# Patient Record
Sex: FEMALE | Race: NATIVE HAWAIIAN OR OTHER PACIFIC ISLANDER | HISPANIC OR LATINO | Employment: FULL TIME | URBAN - METROPOLITAN AREA
[De-identification: names, ages, dates, MRNs, and addresses within clinical notes are randomized per-mention and may not be internally consistent; named-entity substitution may affect disease eponyms.]

---

## 2017-01-05 ENCOUNTER — ALLSCRIPTS OFFICE VISIT (OUTPATIENT)
Dept: OTHER | Facility: OTHER | Age: 37
End: 2017-01-05

## 2017-01-05 LAB — HCG, QUALITATIVE (HISTORICAL): NEGATIVE

## 2017-01-09 ENCOUNTER — HOSPITAL ENCOUNTER (OUTPATIENT)
Dept: RADIOLOGY | Facility: HOSPITAL | Age: 37
Discharge: HOME | End: 2017-01-09
Payer: COMMERCIAL

## 2017-01-09 DIAGNOSIS — N93.9 ABNORMAL UTERINE AND VAGINAL BLEEDING, UNSPECIFIED: ICD-10-CM

## 2017-01-09 PROCEDURE — 76856 US EXAM PELVIC COMPLETE: CPT

## 2017-01-09 PROCEDURE — 76830 TRANSVAGINAL US NON-OB: CPT

## 2017-01-20 ENCOUNTER — APPOINTMENT (OUTPATIENT)
Dept: LAB | Facility: HOSPITAL | Age: 37
End: 2017-01-20
Payer: COMMERCIAL

## 2017-01-20 ENCOUNTER — TRANSCRIBE ORDERS (OUTPATIENT)
Dept: LAB | Facility: HOSPITAL | Age: 37
End: 2017-01-20

## 2017-01-20 DIAGNOSIS — E66.9 OBESITY: ICD-10-CM

## 2017-01-20 DIAGNOSIS — E78.5 HYPERLIPIDEMIA: ICD-10-CM

## 2017-01-20 DIAGNOSIS — E11.65 TYPE 2 DIABETES MELLITUS WITH HYPERGLYCEMIA (HCC): ICD-10-CM

## 2017-01-20 DIAGNOSIS — N93.9 ABNORMAL UTERINE AND VAGINAL BLEEDING, UNSPECIFIED: ICD-10-CM

## 2017-01-20 DIAGNOSIS — N97.9 FEMALE INFERTILITY: ICD-10-CM

## 2017-01-20 DIAGNOSIS — E11.29 TYPE 2 DIABETES MELLITUS WITH OTHER DIABETIC KIDNEY COMPLICATION (HCC): ICD-10-CM

## 2017-01-20 LAB
25(OH)D3 SERPL-MCNC: 14.1 NG/ML (ref 30–100)
ALBUMIN SERPL BCP-MCNC: 3.5 G/DL (ref 3.5–5)
ALP SERPL-CCNC: 60 U/L (ref 46–116)
ALT SERPL W P-5'-P-CCNC: 79 U/L (ref 12–78)
ANION GAP SERPL CALCULATED.3IONS-SCNC: 6 MMOL/L (ref 4–13)
AST SERPL W P-5'-P-CCNC: 32 U/L (ref 5–45)
BASOPHILS # BLD AUTO: 0.03 THOUSANDS/ΜL (ref 0–0.1)
BASOPHILS NFR BLD AUTO: 0 % (ref 0–1)
BILIRUB SERPL-MCNC: 0.49 MG/DL (ref 0.2–1)
BUN SERPL-MCNC: 10 MG/DL (ref 5–25)
CALCIUM SERPL-MCNC: 8.6 MG/DL (ref 8.3–10.1)
CHLORIDE SERPL-SCNC: 104 MMOL/L (ref 100–108)
CHOLEST SERPL-MCNC: 199 MG/DL (ref 50–200)
CO2 SERPL-SCNC: 28 MMOL/L (ref 21–32)
CREAT SERPL-MCNC: 0.67 MG/DL (ref 0.6–1.3)
EOSINOPHIL # BLD AUTO: 0.1 THOUSAND/ΜL (ref 0–0.61)
EOSINOPHIL NFR BLD AUTO: 1 % (ref 0–6)
ERYTHROCYTE [DISTWIDTH] IN BLOOD BY AUTOMATED COUNT: 12.9 % (ref 11.6–15.1)
EST. AVERAGE GLUCOSE BLD GHB EST-MCNC: 143 MG/DL
GFR SERPL CREATININE-BSD FRML MDRD: >60 ML/MIN/1.73SQ M
GLUCOSE SERPL-MCNC: 133 MG/DL (ref 65–140)
HBA1C MFR BLD: 6.6 % (ref 4.2–6.3)
HCT VFR BLD AUTO: 37.6 % (ref 34.8–46.1)
HDLC SERPL-MCNC: 55 MG/DL (ref 40–60)
HGB BLD-MCNC: 13 G/DL (ref 11.5–15.4)
LDLC SERPL CALC-MCNC: 126 MG/DL (ref 0–100)
LYMPHOCYTES # BLD AUTO: 2.49 THOUSANDS/ΜL (ref 0.6–4.47)
LYMPHOCYTES NFR BLD AUTO: 33 % (ref 14–44)
MCH RBC QN AUTO: 29.7 PG (ref 26.8–34.3)
MCHC RBC AUTO-ENTMCNC: 34.6 G/DL (ref 31.4–37.4)
MCV RBC AUTO: 86 FL (ref 82–98)
MONOCYTES # BLD AUTO: 0.45 THOUSAND/ΜL (ref 0.17–1.22)
MONOCYTES NFR BLD AUTO: 6 % (ref 4–12)
NEUTROPHILS # BLD AUTO: 4.55 THOUSANDS/ΜL (ref 1.85–7.62)
NEUTS SEG NFR BLD AUTO: 60 % (ref 43–75)
NRBC BLD AUTO-RTO: 0 /100 WBCS
PLATELET # BLD AUTO: 217 THOUSANDS/UL (ref 149–390)
PMV BLD AUTO: 10.3 FL (ref 8.9–12.7)
POTASSIUM SERPL-SCNC: 3.7 MMOL/L (ref 3.5–5.3)
PROT SERPL-MCNC: 7.2 G/DL (ref 6.4–8.2)
RBC # BLD AUTO: 4.38 MILLION/UL (ref 3.81–5.12)
SODIUM SERPL-SCNC: 138 MMOL/L (ref 136–145)
TRIGL SERPL-MCNC: 92 MG/DL
WBC # BLD AUTO: 7.63 THOUSAND/UL (ref 4.31–10.16)

## 2017-01-20 PROCEDURE — 80061 LIPID PANEL: CPT

## 2017-01-20 PROCEDURE — 85025 COMPLETE CBC W/AUTO DIFF WBC: CPT

## 2017-01-20 PROCEDURE — 82306 VITAMIN D 25 HYDROXY: CPT

## 2017-01-20 PROCEDURE — 83036 HEMOGLOBIN GLYCOSYLATED A1C: CPT

## 2017-01-20 PROCEDURE — 80053 COMPREHEN METABOLIC PANEL: CPT

## 2017-01-20 PROCEDURE — 36415 COLL VENOUS BLD VENIPUNCTURE: CPT

## 2017-02-06 ENCOUNTER — GENERIC CONVERSION - ENCOUNTER (OUTPATIENT)
Dept: OTHER | Facility: OTHER | Age: 37
End: 2017-02-06

## 2017-02-21 ENCOUNTER — ALLSCRIPTS OFFICE VISIT (OUTPATIENT)
Dept: OTHER | Facility: OTHER | Age: 37
End: 2017-02-21

## 2017-02-21 ENCOUNTER — LAB REQUISITION (OUTPATIENT)
Dept: LAB | Facility: HOSPITAL | Age: 37
End: 2017-02-21
Payer: COMMERCIAL

## 2017-02-21 DIAGNOSIS — Z11.3 ENCOUNTER FOR SCREENING FOR INFECTIONS WITH PREDOMINANTLY SEXUAL MODE OF TRANSMISSION: ICD-10-CM

## 2017-02-21 DIAGNOSIS — N93.9 ABNORMAL UTERINE AND VAGINAL BLEEDING, UNSPECIFIED: ICD-10-CM

## 2017-02-21 PROCEDURE — 87491 CHLMYD TRACH DNA AMP PROBE: CPT | Performed by: OBSTETRICS & GYNECOLOGY

## 2017-02-21 PROCEDURE — 87591 N.GONORRHOEAE DNA AMP PROB: CPT | Performed by: OBSTETRICS & GYNECOLOGY

## 2017-02-24 LAB
CHLAMYDIA DNA CVX QL NAA+PROBE: NORMAL
N GONORRHOEA DNA GENITAL QL NAA+PROBE: NORMAL

## 2017-03-10 ENCOUNTER — HOSPITAL ENCOUNTER (OUTPATIENT)
Dept: RADIOLOGY | Facility: HOSPITAL | Age: 37
Discharge: HOME | End: 2017-03-10
Payer: COMMERCIAL

## 2017-03-10 DIAGNOSIS — N93.9 ABNORMAL UTERINE AND VAGINAL BLEEDING, UNSPECIFIED: ICD-10-CM

## 2017-03-10 PROCEDURE — 76856 US EXAM PELVIC COMPLETE: CPT

## 2017-03-10 PROCEDURE — 76830 TRANSVAGINAL US NON-OB: CPT

## 2017-03-17 ENCOUNTER — LAB REQUISITION (OUTPATIENT)
Dept: LAB | Facility: HOSPITAL | Age: 37
End: 2017-03-17
Payer: COMMERCIAL

## 2017-03-17 ENCOUNTER — ALLSCRIPTS OFFICE VISIT (OUTPATIENT)
Dept: OTHER | Facility: OTHER | Age: 37
End: 2017-03-17

## 2017-03-17 DIAGNOSIS — Z11.3 ENCOUNTER FOR SCREENING FOR INFECTIONS WITH PREDOMINANTLY SEXUAL MODE OF TRANSMISSION: ICD-10-CM

## 2017-03-17 LAB
CHLAMYDIA DNA CVX QL NAA+PROBE: NORMAL
N GONORRHOEA DNA GENITAL QL NAA+PROBE: NORMAL

## 2017-03-17 PROCEDURE — 87591 N.GONORRHOEAE DNA AMP PROB: CPT | Performed by: OBSTETRICS & GYNECOLOGY

## 2017-03-17 PROCEDURE — 87491 CHLMYD TRACH DNA AMP PROBE: CPT | Performed by: OBSTETRICS & GYNECOLOGY

## 2017-03-27 ENCOUNTER — ALLSCRIPTS OFFICE VISIT (OUTPATIENT)
Dept: OTHER | Facility: OTHER | Age: 37
End: 2017-03-27

## 2017-04-25 ENCOUNTER — ALLSCRIPTS OFFICE VISIT (OUTPATIENT)
Dept: OTHER | Facility: OTHER | Age: 37
End: 2017-04-25

## 2017-05-04 ENCOUNTER — LAB REQUISITION (OUTPATIENT)
Dept: LAB | Facility: HOSPITAL | Age: 37
End: 2017-05-04
Payer: COMMERCIAL

## 2017-05-04 ENCOUNTER — ALLSCRIPTS OFFICE VISIT (OUTPATIENT)
Dept: OTHER | Facility: OTHER | Age: 37
End: 2017-05-04

## 2017-05-04 DIAGNOSIS — N97.9 FEMALE INFERTILITY: ICD-10-CM

## 2017-05-04 PROCEDURE — 87070 CULTURE OTHR SPECIMN AEROBIC: CPT | Performed by: OBSTETRICS & GYNECOLOGY

## 2017-05-06 LAB
BACTERIA GENITAL AEROBE CULT: NORMAL
BACTERIA GENITAL AEROBE CULT: NORMAL

## 2017-05-24 ENCOUNTER — LAB REQUISITION (OUTPATIENT)
Dept: LAB | Facility: HOSPITAL | Age: 37
End: 2017-05-24
Payer: COMMERCIAL

## 2017-05-24 ENCOUNTER — ALLSCRIPTS OFFICE VISIT (OUTPATIENT)
Dept: OTHER | Facility: OTHER | Age: 37
End: 2017-05-24

## 2017-05-24 DIAGNOSIS — R10.9 ABDOMINAL PAIN: ICD-10-CM

## 2017-05-24 DIAGNOSIS — E11.29 TYPE 2 DIABETES MELLITUS WITH OTHER DIABETIC KIDNEY COMPLICATION (HCC): ICD-10-CM

## 2017-05-24 DIAGNOSIS — E11.65 TYPE 2 DIABETES MELLITUS WITH HYPERGLYCEMIA (HCC): ICD-10-CM

## 2017-05-24 LAB
BACTERIA UR QL AUTO: ABNORMAL /HPF
BILIRUB UR QL STRIP: NEGATIVE
BILIRUB UR QL STRIP: NEGATIVE
CLARITY UR: CLEAR
CLARITY UR: NORMAL
COLOR UR: YELLOW
COLOR UR: YELLOW
GLUCOSE (HISTORICAL): 2000
GLUCOSE UR STRIP-MCNC: ABNORMAL MG/DL
HGB UR QL STRIP.AUTO: ABNORMAL
HGB UR QL STRIP.AUTO: NORMAL
KETONES UR STRIP-MCNC: NEGATIVE MG/DL
KETONES UR STRIP-MCNC: NEGATIVE MG/DL
LEUKOCYTE ESTERASE UR QL STRIP: NEGATIVE
LEUKOCYTE ESTERASE UR QL STRIP: NEGATIVE
NITRITE UR QL STRIP: NEGATIVE
NITRITE UR QL STRIP: NEGATIVE
NON-SQ EPI CELLS URNS QL MICRO: ABNORMAL /HPF
PH UR STRIP.AUTO: 5 [PH]
PH UR STRIP.AUTO: 5.5 [PH] (ref 4.5–8)
PROT UR STRIP-MCNC: NEGATIVE MG/DL
PROT UR STRIP-MCNC: NEGATIVE MG/DL
RBC #/AREA URNS AUTO: ABNORMAL /HPF
SP GR UR STRIP.AUTO: 1.01
SP GR UR STRIP.AUTO: 1.04 (ref 1–1.03)
UROBILINOGEN UR QL STRIP.AUTO: 0.2
UROBILINOGEN UR QL STRIP.AUTO: 0.2 E.U./DL
WBC #/AREA URNS AUTO: ABNORMAL /HPF

## 2017-05-24 PROCEDURE — 81001 URINALYSIS AUTO W/SCOPE: CPT | Performed by: NURSE PRACTITIONER

## 2017-05-30 ENCOUNTER — APPOINTMENT (OUTPATIENT)
Dept: LAB | Facility: HOSPITAL | Age: 37
End: 2017-05-30
Payer: COMMERCIAL

## 2017-05-30 ENCOUNTER — TRANSCRIBE ORDERS (OUTPATIENT)
Dept: LAB | Facility: HOSPITAL | Age: 37
End: 2017-05-30

## 2017-05-30 DIAGNOSIS — E11.65 TYPE 2 DIABETES MELLITUS WITH HYPERGLYCEMIA (HCC): ICD-10-CM

## 2017-05-30 DIAGNOSIS — E11.29 TYPE 2 DIABETES MELLITUS WITH OTHER DIABETIC KIDNEY COMPLICATION (HCC): ICD-10-CM

## 2017-05-30 DIAGNOSIS — R10.9 ABDOMINAL PAIN: ICD-10-CM

## 2017-05-30 LAB
ALBUMIN SERPL BCP-MCNC: 3.7 G/DL (ref 3.5–5)
ALP SERPL-CCNC: 74 U/L (ref 46–116)
ALT SERPL W P-5'-P-CCNC: 70 U/L (ref 12–78)
ANION GAP SERPL CALCULATED.3IONS-SCNC: 7 MMOL/L (ref 4–13)
AST SERPL W P-5'-P-CCNC: 25 U/L (ref 5–45)
BILIRUB SERPL-MCNC: 0.49 MG/DL (ref 0.2–1)
BUN SERPL-MCNC: 12 MG/DL (ref 5–25)
CALCIUM SERPL-MCNC: 8.7 MG/DL (ref 8.3–10.1)
CHLORIDE SERPL-SCNC: 101 MMOL/L (ref 100–108)
CO2 SERPL-SCNC: 28 MMOL/L (ref 21–32)
CREAT SERPL-MCNC: 0.68 MG/DL (ref 0.6–1.3)
ERYTHROCYTE [DISTWIDTH] IN BLOOD BY AUTOMATED COUNT: 12.1 % (ref 11.6–15.1)
EST. AVERAGE GLUCOSE BLD GHB EST-MCNC: 269 MG/DL
GFR SERPL CREATININE-BSD FRML MDRD: >60 ML/MIN/1.73SQ M
GLUCOSE P FAST SERPL-MCNC: 309 MG/DL (ref 65–99)
HBA1C MFR BLD: 11 % (ref 4.2–6.3)
HCT VFR BLD AUTO: 41.2 % (ref 34.8–46.1)
HGB BLD-MCNC: 14.1 G/DL (ref 11.5–15.4)
MCH RBC QN AUTO: 29.3 PG (ref 26.8–34.3)
MCHC RBC AUTO-ENTMCNC: 34.2 G/DL (ref 31.4–37.4)
MCV RBC AUTO: 86 FL (ref 82–98)
PLATELET # BLD AUTO: 200 THOUSANDS/UL (ref 149–390)
PMV BLD AUTO: 11.1 FL (ref 8.9–12.7)
POTASSIUM SERPL-SCNC: 3.8 MMOL/L (ref 3.5–5.3)
PROT SERPL-MCNC: 7 G/DL (ref 6.4–8.2)
RBC # BLD AUTO: 4.81 MILLION/UL (ref 3.81–5.12)
SODIUM SERPL-SCNC: 136 MMOL/L (ref 136–145)
WBC # BLD AUTO: 7.15 THOUSAND/UL (ref 4.31–10.16)

## 2017-05-30 PROCEDURE — 83036 HEMOGLOBIN GLYCOSYLATED A1C: CPT

## 2017-05-30 PROCEDURE — 36415 COLL VENOUS BLD VENIPUNCTURE: CPT

## 2017-05-30 PROCEDURE — 85027 COMPLETE CBC AUTOMATED: CPT

## 2017-05-30 PROCEDURE — 80053 COMPREHEN METABOLIC PANEL: CPT

## 2017-05-31 ENCOUNTER — ALLSCRIPTS OFFICE VISIT (OUTPATIENT)
Dept: OTHER | Facility: OTHER | Age: 37
End: 2017-05-31

## 2017-06-05 ENCOUNTER — GENERIC CONVERSION - ENCOUNTER (OUTPATIENT)
Dept: OTHER | Facility: OTHER | Age: 37
End: 2017-06-05

## 2017-06-20 ENCOUNTER — ALLSCRIPTS OFFICE VISIT (OUTPATIENT)
Dept: OTHER | Facility: OTHER | Age: 37
End: 2017-06-20

## 2017-11-14 ENCOUNTER — GENERIC CONVERSION - ENCOUNTER (OUTPATIENT)
Dept: OTHER | Facility: OTHER | Age: 37
End: 2017-11-14

## 2017-11-14 DIAGNOSIS — E11.65 TYPE 2 DIABETES MELLITUS WITH HYPERGLYCEMIA (HCC): ICD-10-CM

## 2017-11-15 ENCOUNTER — GENERIC CONVERSION - ENCOUNTER (OUTPATIENT)
Dept: OTHER | Facility: OTHER | Age: 37
End: 2017-11-15

## 2017-11-18 ENCOUNTER — TRANSCRIBE ORDERS (OUTPATIENT)
Dept: LAB | Facility: HOSPITAL | Age: 37
End: 2017-11-18

## 2017-11-18 ENCOUNTER — APPOINTMENT (OUTPATIENT)
Dept: LAB | Facility: HOSPITAL | Age: 37
End: 2017-11-18
Payer: COMMERCIAL

## 2017-11-18 DIAGNOSIS — E11.65 TYPE 2 DIABETES MELLITUS WITH HYPERGLYCEMIA (HCC): ICD-10-CM

## 2017-11-18 LAB
ALBUMIN SERPL BCP-MCNC: 3.5 G/DL (ref 3.5–5)
ALP SERPL-CCNC: 51 U/L (ref 46–116)
ALT SERPL W P-5'-P-CCNC: 32 U/L (ref 12–78)
ANION GAP SERPL CALCULATED.3IONS-SCNC: 7 MMOL/L (ref 4–13)
AST SERPL W P-5'-P-CCNC: 15 U/L (ref 5–45)
BILIRUB SERPL-MCNC: 0.53 MG/DL (ref 0.2–1)
BUN SERPL-MCNC: 11 MG/DL (ref 5–25)
CALCIUM SERPL-MCNC: 8.6 MG/DL (ref 8.3–10.1)
CHLORIDE SERPL-SCNC: 103 MMOL/L (ref 100–108)
CHOLEST SERPL-MCNC: 222 MG/DL (ref 50–200)
CO2 SERPL-SCNC: 26 MMOL/L (ref 21–32)
CREAT SERPL-MCNC: 0.54 MG/DL (ref 0.6–1.3)
CREAT UR-MCNC: 168 MG/DL
EST. AVERAGE GLUCOSE BLD GHB EST-MCNC: 160 MG/DL
GFR SERPL CREATININE-BSD FRML MDRD: 121 ML/MIN/1.73SQ M
GLUCOSE P FAST SERPL-MCNC: 113 MG/DL (ref 65–99)
HBA1C MFR BLD: 7.2 % (ref 4.2–6.3)
HDLC SERPL-MCNC: 69 MG/DL (ref 40–60)
LDLC SERPL CALC-MCNC: 131 MG/DL (ref 0–100)
MICROALBUMIN UR-MCNC: 55.7 MG/L (ref 0–20)
MICROALBUMIN/CREAT 24H UR: 33 MG/G CREATININE (ref 0–30)
POTASSIUM SERPL-SCNC: 3.7 MMOL/L (ref 3.5–5.3)
PROT SERPL-MCNC: 7.4 G/DL (ref 6.4–8.2)
SODIUM SERPL-SCNC: 136 MMOL/L (ref 136–145)
TRIGL SERPL-MCNC: 108 MG/DL
TSH SERPL DL<=0.05 MIU/L-ACNC: 1.93 UIU/ML (ref 0.36–3.74)

## 2017-11-18 PROCEDURE — 80061 LIPID PANEL: CPT

## 2017-11-18 PROCEDURE — 82570 ASSAY OF URINE CREATININE: CPT

## 2017-11-18 PROCEDURE — 84443 ASSAY THYROID STIM HORMONE: CPT

## 2017-11-18 PROCEDURE — 82043 UR ALBUMIN QUANTITATIVE: CPT

## 2017-11-18 PROCEDURE — 83036 HEMOGLOBIN GLYCOSYLATED A1C: CPT

## 2017-11-18 PROCEDURE — 36415 COLL VENOUS BLD VENIPUNCTURE: CPT

## 2017-11-18 PROCEDURE — 80053 COMPREHEN METABOLIC PANEL: CPT

## 2017-12-11 ENCOUNTER — GENERIC CONVERSION - ENCOUNTER (OUTPATIENT)
Dept: OTHER | Facility: OTHER | Age: 37
End: 2017-12-11

## 2017-12-26 ENCOUNTER — GENERIC CONVERSION - ENCOUNTER (OUTPATIENT)
Dept: FAMILY MEDICINE CLINIC | Facility: CLINIC | Age: 37
End: 2017-12-26

## 2017-12-26 LAB
LEFT EYE DIABETIC RETINOPATHY: NORMAL
RIGHT EYE DIABETIC RETINOPATHY: NORMAL

## 2018-01-10 NOTE — RESULT NOTES
Verified Results  (1) COMPREHENSIVE METABOLIC PANEL [id number] 72:72LH Holli [place]    Order Number: [id number]     Test Name Result Flag Reference   GLUCOSE,RANDM 133 mg/dL     If the patient is fasting, the ADA then defines impaired fasting glucose as > 100 mg/dL and diabetes as > or equal to 123 mg/dL  SODIUM 138 mmol/L  136-145   POTASSIUM 3 7 mmol/L  3 5-5 3   CHLORIDE 104 mmol/L  100-108   CARBON DIOXIDE 28 mmol/L  21-32   ANION GAP (CALC) 6 mmol/L  4-13   BLOOD UREA NITROGEN 10 mg/dL  5-25   CREATININE 0 67 mg/dL  0 60-1 30   Standardized to IDMS reference method   CALCIUM 8 6 mg/dL  8 3-10 1   BILI, TOTAL 0 49 mg/dL  0 20-1 00   ALK PHOSPHATAS 60 U/L     ALT (SGPT) 79 U/L H 12-78   AST(SGOT) 32 U/L  5-45   ALBUMIN 3 5 g/dL  3 5-5 0   TOTAL PROTEIN 7 2 g/dL  6 4-8 2   eGFR Non-African American      >60 0 ml/min/1 73sq m   - Patient Instructions: This is a fasting blood test  Water,black tea or black  coffee only after 9:00pm the night before test Drink 2 glasses of water the morning of test   National Kidney Disease Education Program recommendations are as follows:  GFR calculation is accurate only with a steady state creatinine  Chronic Kidney disease less than 60 ml/min/1 73 sq  meters  Kidney failure less than 15 ml/min/1 73 sq  meters  (1) CBC/PLT/DIFF 20Jan2017 09:43AM Holli [place]    Order Number: [id number]     Test Name Result Flag Reference   WBC COUNT 7 63 Thousand/uL  4 31-10 16   RBC COUNT 4 38 Million/uL  3 81-5 12   HEMOGLOBIN 13 0 g/dL  11 5-15 4   HEMATOCRIT 37 6 %  34 8-46  1   MCV 86 fL  82-98   MCH 29 7 pg  26 8-34 3   MCHC 34 6 g/dL  31 4-37 4   RDW 12 9 %  11 6-15 1   MPV 10 3 fL  8 9-12 7   PLATELET COUNT 129 Thousands/uL  149-390   nRBC AUTOMATED 0 /100 WBCs     NEUTROPHILS RELATIVE PERCENT 60 %  43-75   LYMPHOCYTES RELATIVE PERCENT 33 %  14-44   MONOCYTES RELATIVE PERCENT 6 %  4-12   EOSINOPHILS RELATIVE PERCENT 1 %  0-6   BASOPHILS RELATIVE PERCENT 0 %  0-1   NEUTROPHILS ABSOLUTE COUNT 4 55 Thousands/?L  1 85-7 62   LYMPHOCYTES ABSOLUTE COUNT 2 49 Thousands/?L  0 60-4 47   MONOCYTES ABSOLUTE COUNT 0 45 Thousand/?L  0 17-1 22   EOSINOPHILS ABSOLUTE COUNT 0 10 Thousand/?L  0 00-0 61   BASOPHILS ABSOLUTE COUNT 0 03 Thousands/?L  0 00-0 10   - Patient Instructions: This bloodwork is non-fasting  Please drink two glasses of water morning of bloodwork  - Patient Instructions: This bloodwork is non-fasting  Please drink two glasses of water morning of bloodwork  (1) HEMOGLOBIN A1C 20Jan2017 09:43AM Holli Judit    Order Number: [id number]     Test Name Result Flag Reference   HEMOGLOBIN A1C 6 6 % H 4 2-6 3   EST  AVG  GLUCOSE 143 mg/dl       (1) LIPID PANEL, FASTING 20Jan2017 09:43AM Holli Judit    Order Number: [id number]     Test Name Result Flag Reference   CHOLESTEROL 199 mg/dL     HDL,DIRECT 55 mg/dL  40-60   Specimen collection should occur prior to Metamizole administration due to the potential for falsely depressed results  LDL CHOLESTEROL CALCULATED 126 mg/dL H 0-100   - Patient Instructions: This is a fasting blood test  Water,black tea or black  coffee only after 9:00pm the night before test   Drink 2 glasses of water the morning of test     - Patient Instructions: This is a fasting blood test  Water,black tea or black  coffee only after 9:00pm the night before test Drink 2 glasses of water the morning of test   Triglyceride:         Normal              <150 mg/dl       Borderline High    150-199 mg/dl       High               200-499 mg/dl       Very High          >499 mg/dl  Cholesterol:         Desirable        <200 mg/dl      Borderline High  200-239 mg/dl      High             >239 mg/dl  HDL Cholesterol:        High    >59 mg/dL      Low     <41 mg/dL  LDL CALCULATED:    This screening LDL is a calculated result    It does not have the accuracy of the Direct Measured LDL in the monitoring of patients with hyperlipidemia and/or statin therapy  Direct Measure LDL (PRV321) must be ordered separately in these patients  TRIGLYCERIDES 92 mg/dL  <=150   Specimen collection should occur prior to N-Acetylcysteine or Metamizole administration due to the potential for falsely depressed results  (1) VITAMIN D 25-HYDROXY [id number] 09:43AM [facility]    Order Number: [id number]     Test Name Result Flag Reference   VIT D 25-HYDROX 14 1 ng/mL L 30 0-100 0   This assay is a certified procedure of the CDC Vitamin D Standardization Certification Program (VDSCP)     Deficiency <20ng/ml   Insufficiency 20-30ng/ml   Sufficient  ng/ml     *Patients undergoing fluorescein dye angiography may retain small amounts of fluorescein in the body for 48-72 hours post procedure  Samples containing fluorescein can produce falsely elevated Vitamin D values  If the patient had this procedure, a specimen should be resubmitted post fluorescein clearance         Plan  Vitamin D deficiency    · Vitamin D (Ergocalciferol) 75698 UNIT Oral Capsule; TAKE 1 CAPSULE WEEKLY

## 2018-01-11 NOTE — MISCELLANEOUS
Provider Comments  Provider Comments:   pt was a no show today      Signatures   Electronically signed by : Henrry Desai, ; Bulmaro 15 2016  1:37PM EST                       (Author)    Electronically signed by : Missy Hamilton DO; Bulmaro 15 2016  2:13PM EST                       (Author)    Electronically signed by : Carlos Caldwell DO; Jun 24 2016  9:22AM EST                       (Author)

## 2018-01-12 NOTE — RESULT NOTES
Discussion/Summary   -A1C=11 confirms DM has been out of contol for at least past 3 months  Plan- increase metformin  See Rx  Has 6/20 appointment w/Dr Eliazar Katz  and follow up sooner if has questions/concerns     -Urine showed DM uncontrolled and had blood in it  I believe she reported she was menstruating  Plan-recheck UA when not menstruating   -Blood count and metabolic panel results wnl with exception of glucose which was very elevated-uncontrolled DM     Verified Results  (1) CBC/ PLT (NO DIFF) [id number] 06:57AM [facility]    Order Number: [id number]     Test Name Result Flag Reference   HEMATOCRIT 41 2 %  34 8-46 1   HEMOGLOBIN 14 1 g/dL  11 5-15 4   MCHC 34 2 g/dL  31 4-37 4   MCH 29 3 pg  26 8-34 3   MCV 86 fL  82-98   PLATELET COUNT 014 Thousands/uL  149-390   RBC COUNT 4 81 Million/uL  3 81-5 12   RDW 12 1 %  11 6-15 1   WBC COUNT 7 15 Thousand/uL  4 31-10 16   MPV 11 1 fL  8 9-12 7     (1) COMPREHENSIVE METABOLIC PANEL [id number] 48:57XM [place]    Order Number: [id number]     Test Name Result Flag Reference   SODIUM 136 mmol/L  136-145   POTASSIUM 3 8 mmol/L  3 5-5 3   CHLORIDE 101 mmol/L  100-108   CARBON DIOXIDE 28 mmol/L  21-32   ANION GAP (CALC) 7 mmol/L  4-13   BLOOD UREA NITROGEN 12 mg/dL  5-25   CREATININE 0 68 mg/dL  0 60-1 30   Standardized to IDMS reference method   CALCIUM 8 7 mg/dL  8 3-10 1   BILI, TOTAL 0 49 mg/dL  0 20-1 00   ALK PHOSPHATAS 74 U/L     ALT (SGPT) 70 U/L  12-78   AST(SGOT) 25 U/L  5-45   ALBUMIN 3 7 g/dL  3 5-5 0   TOTAL PROTEIN 7 0 g/dL  6 4-8 2   eGFR Non-African American      >60 0 ml/min/1 73sq m   [facility] Disease Education Program recommendations are as follows:  GFR calculation is accurate only with a steady state creatinine  Chronic Kidney disease less than 60 ml/min/1 73 sq  meters  Kidney failure less than 15 ml/min/1 73 sq  meters     GLUCOSE FASTING 309 mg/dL H 65-99     (1) HEMOGLOBIN A1C [id number] 06:57AM Megan Dominguez, [facility] Order Number: [id number]     Test Name Result Flag Reference   HEMOGLOBIN A1C 11 0 % H 4 2-6 3   EST  AVG   GLUCOSE 269 mg/dl       (1) URINALYSIS (will reflex a microscopy if leukocytes, occult blood, protein or nitrites are not within normal limits) [id number] 06:59PM Mindy Peasant     Test Name Result Flag Reference   BACTERIA None Seen /hpf  None Seen, Occasional   EPITHELIAL CELLS None Seen /hpf  None Seen, Occasional   RBC UA 2-4 /hpf A None Seen   WBC UA None Seen /hpf  None Seen     (1) URINALYSIS (will reflex a microscopy if leukocytes, occult blood, protein or nitrites are not within normal limits) [id number] 06:59PM Mindy Peasant     Test Name Result Flag Reference   COLOR Yellow     CLARITY Clear     SPECIFIC GRAVITY UA 1 038 H 1 003-1 030   PH UA 5 5  4 5-8 0   LEUKOCYTE ESTERASE UA Negative  Negative   NITRITE UA Negative  Negative   PROTEIN UA Negative mg/dl  Negative   GLUCOSE UA >=1000 (1%) mg/dl A Negative   KETONES UA Negative mg/dl  Negative   UROBILINOGEN UA 0 2 E U /dl  0 2, 1 0 E U /dl   BILIRUBIN UA Negative  Negative   BLOOD UA Large A Negative     Urine Dip Non-Automated- POC [id number] 02:12PM Ob[facility]     Test Name Result Flag Reference   Color Yellow     Clarity Transparent     Leukocytes negative     Nitrite negative     Blood large     Bilirubin negative     Urobilinogen 0 2     Protein negative     Ph 5 0     Specific Gravity 1 010     Ketone negative     Glucose 2000         Plan  Hematuria    · Urine Dip Automated- POC; Status:Active - Perform Order; Requested RKT:[id number];   Type 2 diabetes mellitus, uncontrolled, with renal complications    · MetFORMIN HCl - 1000 MG Oral Tablet; TAKE 1/2 tablet with morning meal and  take a whole tablet with evening meal

## 2018-01-12 NOTE — MISCELLANEOUS
Provider Comments  Provider Comments:   pt was a no show today      Signatures   Electronically signed by : Karen Bedoya, ; Jun 20 2017  1:30PM EST                       (Author)

## 2018-01-13 VITALS
TEMPERATURE: 97.9 F | DIASTOLIC BLOOD PRESSURE: 70 MMHG | WEIGHT: 196 LBS | BODY MASS INDEX: 36.07 KG/M2 | RESPIRATION RATE: 16 BRPM | HEART RATE: 88 BPM | SYSTOLIC BLOOD PRESSURE: 110 MMHG | HEIGHT: 62 IN

## 2018-01-13 VITALS
WEIGHT: 184.13 LBS | DIASTOLIC BLOOD PRESSURE: 80 MMHG | BODY MASS INDEX: 33.88 KG/M2 | HEART RATE: 101 BPM | HEIGHT: 62 IN | RESPIRATION RATE: 18 BRPM | SYSTOLIC BLOOD PRESSURE: 118 MMHG

## 2018-01-13 VITALS
DIASTOLIC BLOOD PRESSURE: 70 MMHG | WEIGHT: 182 LBS | HEIGHT: 62 IN | SYSTOLIC BLOOD PRESSURE: 118 MMHG | BODY MASS INDEX: 33.49 KG/M2 | HEART RATE: 79 BPM

## 2018-01-14 VITALS
SYSTOLIC BLOOD PRESSURE: 119 MMHG | HEIGHT: 62 IN | BODY MASS INDEX: 36.25 KG/M2 | DIASTOLIC BLOOD PRESSURE: 73 MMHG | WEIGHT: 197 LBS

## 2018-01-14 VITALS
RESPIRATION RATE: 18 BRPM | HEIGHT: 62 IN | HEART RATE: 80 BPM | WEIGHT: 176 LBS | BODY MASS INDEX: 32.39 KG/M2 | TEMPERATURE: 97.2 F | SYSTOLIC BLOOD PRESSURE: 104 MMHG | DIASTOLIC BLOOD PRESSURE: 70 MMHG

## 2018-01-14 VITALS
HEIGHT: 62 IN | HEART RATE: 80 BPM | SYSTOLIC BLOOD PRESSURE: 111 MMHG | BODY MASS INDEX: 36.44 KG/M2 | WEIGHT: 198 LBS | DIASTOLIC BLOOD PRESSURE: 76 MMHG

## 2018-01-15 VITALS
TEMPERATURE: 98.5 F | BODY MASS INDEX: 35.51 KG/M2 | HEIGHT: 62 IN | HEART RATE: 78 BPM | RESPIRATION RATE: 18 BRPM | DIASTOLIC BLOOD PRESSURE: 80 MMHG | WEIGHT: 193 LBS | SYSTOLIC BLOOD PRESSURE: 120 MMHG

## 2018-01-15 NOTE — MISCELLANEOUS
Provider Comments  Provider Comments:   pt no showed today      Signatures   Electronically signed by : Arun Yang, ; Aug 15 2016  6:36PM EST                       (Author)    Electronically signed by : Nova Medley DO; Aug 15 2016 11:15PM EST                       (Author)    Electronically signed by : Nova Medley DO; Aug 15 2016 11:15PM EST                       (Author)

## 2018-01-17 NOTE — MISCELLANEOUS
Provider Comments  Provider Comments:   pt was a no show for appt today      Signatures   Electronically signed by : Darren Alejo, ; May 31 2017  9:56AM EST                       (Author)

## 2018-01-18 ENCOUNTER — GENERIC CONVERSION - ENCOUNTER (OUTPATIENT)
Dept: OTHER | Facility: OTHER | Age: 38
End: 2018-01-18

## 2018-01-18 NOTE — RESULT NOTES
Verified Results  (1) COMPREHENSIVE METABOLIC PANEL [id number] 52:67RC [facility] Number: [id number]     Test Name Result Flag Reference   SODIUM 136 mmol/L  136-145   POTASSIUM 3 7 mmol/L  3 5-5 3   CHLORIDE 103 mmol/L  100-108   CARBON DIOXIDE 26 mmol/L  21-32   ANION GAP (CALC) 7 mmol/L  4-13   BLOOD UREA NITROGEN 11 mg/dL  5-25   CREATININE 0 54 mg/dL L 0 60-1 30   Standardized to IDMS reference method   CALCIUM 8 6 mg/dL  8 3-10 1   BILI, TOTAL 0 53 mg/dL  0 20-1 00   ALK PHOSPHATAS 51 U/L     ALT (SGPT) 32 U/L  12-78   Specimen collection should occur prior to Sulfasalazine and/or Sulfapyridine administration due to the potential for falsely depressed results  AST(SGOT) 15 U/L  5-45   Specimen collection should occur prior to Sulfasalazine administration due to the potential for falsely depressed results  ALBUMIN 3 5 g/dL  3 5-5 0   TOTAL PROTEIN 7 4 g/dL  6 4-8 2   eGFR 121 ml/min/1 73sq [facility] Disease Education Program recommendations are as follows:  GFR calculation is accurate only with a steady state creatinine  Chronic Kidney disease less than 60 ml/min/1 73 sq  meters  Kidney failure less than 15 ml/min/1 73 sq  meters  GLUCOSE FASTING 113 mg/dL H 65-99   Specimen collection should occur prior to Sulfasalazine administration due to the potential for falsely depressed results  Specimen collection should occur prior to Sulfapyridine administration due to the potential for falsely elevated results  (1) HEMOGLOBIN A1C [id number] 10:29AM [facility] Order Number: [id number]     Test Name Result Flag Reference   HEMOGLOBIN A1C 7 2 % H 4 2-6 3   EST  AVG   GLUCOSE 160 mg/dl       (1) LIPID PANEL, FASTING [id number] 10:29AM [facility] Order Number: [id number]     Test Name Result Flag Reference   CHOLESTEROL 222 mg/dL H    HDL,DIRECT 69 mg/dL H 40-60   Specimen collection should occur prior to Metamizole administration due to the potential for falsley depressed results  LDL CHOLESTEROL CALCULATED 131 mg/dL H 0-100   Triglyceride:        Normal <150 mg/dl   Borderline High 150-199 mg/dl   High 200-499 mg/dl   Very High >499 mg/dl      Cholesterol:       Desirable <200 mg/dl    Borderline High 200-239 mg/dl    High >239 mg/dl      HDL Cholesterol:       High>59 mg/dL    Low <41 mg/dL      This screening LDL is a calculated result  It does not have the accuracy of the Direct Measured LDL in the monitoring of patients with hyperlipidemia and/or statin therapy  Direct Measure LDL (WRY261) must be ordered separately in these patients  TRIGLYCERIDES 108 mg/dL  <=150   Specimen collection should occur prior to N-Acetylcysteine or Metamizole administration due to the potential for falsely depressed results  (1) TSH [id number] 10:29AM [employer] Order Number: [id number]     Test Name Result Flag Reference   TSH 1 930 uIU/mL  0 358-3 740   Patients undergoing fluorescein dye angiography may retain small amounts of fluorescein in the body for 48-72 hours post procedure  Samples containing fluorescein can produce falsely depressed TSH values  If the patient had this procedure,a specimen should be resubmitted post fluorescein clearance            The recommended reference ranges for TSH during pregnancy are as follows:  First trimester 0 1 to 2 5 uIU/mL  Second trimester  0 2 to 3 0 uIU/mL  Third trimester 0 3 to 3 0 uIU/m     (1) MICROALBUMIN CREATININE RATIO, RANDOM URINE [id number] 10:29AM "[employer]" Order Number: [id number]     Test Name Result Flag Reference   MICROALBUMIN/ CREAT R 33 mg/g creatinine H 0-30   MICROALBUMIN,URINE 55 7 mg/L H 0 0-20 0   CREATININE URINE 168 0 mg/dL

## 2018-01-22 VITALS
HEIGHT: 62 IN | TEMPERATURE: 98.1 F | HEART RATE: 68 BPM | RESPIRATION RATE: 16 BRPM | SYSTOLIC BLOOD PRESSURE: 120 MMHG | BODY MASS INDEX: 35.56 KG/M2 | DIASTOLIC BLOOD PRESSURE: 70 MMHG | WEIGHT: 193.25 LBS

## 2018-01-24 VITALS
RESPIRATION RATE: 16 BRPM | HEIGHT: 62 IN | DIASTOLIC BLOOD PRESSURE: 70 MMHG | SYSTOLIC BLOOD PRESSURE: 122 MMHG | BODY MASS INDEX: 35.7 KG/M2 | WEIGHT: 194 LBS | HEART RATE: 70 BPM | TEMPERATURE: 98.6 F

## 2018-01-24 VITALS
WEIGHT: 194 LBS | SYSTOLIC BLOOD PRESSURE: 116 MMHG | HEIGHT: 62 IN | BODY MASS INDEX: 35.7 KG/M2 | DIASTOLIC BLOOD PRESSURE: 78 MMHG

## 2018-02-08 ENCOUNTER — OFFICE VISIT (OUTPATIENT)
Dept: FAMILY MEDICINE CLINIC | Facility: CLINIC | Age: 38
End: 2018-02-08
Payer: COMMERCIAL

## 2018-02-08 VITALS
HEART RATE: 78 BPM | BODY MASS INDEX: 34.96 KG/M2 | TEMPERATURE: 98.5 F | SYSTOLIC BLOOD PRESSURE: 110 MMHG | RESPIRATION RATE: 18 BRPM | WEIGHT: 190 LBS | HEIGHT: 62 IN | DIASTOLIC BLOOD PRESSURE: 60 MMHG

## 2018-02-08 DIAGNOSIS — J06.9 ACUTE RESPIRATORY DISEASE: Primary | ICD-10-CM

## 2018-02-08 PROCEDURE — 99213 OFFICE O/P EST LOW 20 MIN: CPT | Performed by: FAMILY MEDICINE

## 2018-02-08 RX ORDER — BLOOD SUGAR DIAGNOSTIC
STRIP MISCELLANEOUS 2 TIMES DAILY
Refills: 11 | COMMUNITY
Start: 2018-02-02 | End: 2018-06-07 | Stop reason: SDUPTHER

## 2018-02-08 NOTE — PROGRESS NOTES
Assessment/Plan:      1  Upper Respiratory Infection  Supportive care  Return parameters d/w pt  May return to work 2/12/18  F/U [facility]  Subjective:     Patient ID: Mary Delacruz is a 45 y o  female  46 yo female with several day history of subjective fever, non-productive cough, runny nose, myalgias  No chills/nausea/vomiting  Eating and drinking OK  No rash  No wheezing or SOB   + ill contacts  No COPD, asthma  Non-smoker  Review of Systems   Constitutional: Positive for fatigue  Negative for chills, diaphoresis and fever  HENT: Positive for congestion, postnasal drip and rhinorrhea  Negative for ear discharge, ear pain, sinus pain and sinus pressure  Respiratory: Negative for cough, chest tightness, shortness of breath and wheezing  Cardiovascular: Negative for chest pain  Hematological: Negative for adenopathy  Objective:  Vitals:    02/08/18 0954   BP: 110/60   Pulse: 78   Resp: 18   Temp: 98 5 °F (36 9 °C)        Physical Exam   Constitutional: She appears well-developed and well-nourished  Ill-appearing, NAD   HENT:   Right Ear: External ear normal    Left Ear: External ear normal    Mouth/Throat: Oropharynx is clear and moist  No oropharyngeal exudate  + coryza   Neck: Normal range of motion  Neck supple  Cardiovascular: Normal rate, regular rhythm, normal heart sounds and intact distal pulses  Pulmonary/Chest: Effort normal and breath sounds normal    Skin: Skin is warm and dry  No rash noted  Nursing note and vitals reviewed

## 2018-02-08 NOTE — LETTER
February 8, 2018     Patient: Tristen Cheng   YOB: 1980   Date of Visit: 2/8/2018       To Whom it May Concern:    Tristen Cheng is under my professional care  She was seen in my office on 2/8/2018  She may return to work on 2/12/2018  If you have any questions or concerns, please don't hesitate to call           Sincerely,          Enrique Holden MD        CC: No Recipients

## 2018-03-06 ENCOUNTER — APPOINTMENT (OUTPATIENT)
Dept: LAB | Facility: HOSPITAL | Age: 38
End: 2018-03-06
Payer: COMMERCIAL

## 2018-03-06 DIAGNOSIS — E11.29 TYPE 2 DIABETES MELLITUS WITH OTHER DIABETIC KIDNEY COMPLICATION (CODE): ICD-10-CM

## 2018-03-06 DIAGNOSIS — E11.65 TYPE 2 DIABETES MELLITUS WITH HYPERGLYCEMIA (HCC): ICD-10-CM

## 2018-03-06 DIAGNOSIS — R80.9 PROTEINURIA: ICD-10-CM

## 2018-03-06 LAB
CREAT UR-MCNC: 246 MG/DL
EST. AVERAGE GLUCOSE BLD GHB EST-MCNC: 212 MG/DL
HBA1C MFR BLD: 9 % (ref 4.2–6.3)
MICROALBUMIN UR-MCNC: 53.3 MG/L (ref 0–20)
MICROALBUMIN/CREAT 24H UR: 22 MG/G CREATININE (ref 0–30)

## 2018-03-06 PROCEDURE — 82570 ASSAY OF URINE CREATININE: CPT

## 2018-03-06 PROCEDURE — 83036 HEMOGLOBIN GLYCOSYLATED A1C: CPT

## 2018-03-06 PROCEDURE — 82043 UR ALBUMIN QUANTITATIVE: CPT

## 2018-03-06 PROCEDURE — 36415 COLL VENOUS BLD VENIPUNCTURE: CPT

## 2018-03-06 PROCEDURE — 3061F NEG MICROALBUMINURIA REV: CPT | Performed by: FAMILY MEDICINE

## 2018-03-08 ENCOUNTER — OFFICE VISIT (OUTPATIENT)
Dept: FAMILY MEDICINE CLINIC | Facility: CLINIC | Age: 38
End: 2018-03-08
Payer: COMMERCIAL

## 2018-03-08 VITALS
WEIGHT: 192 LBS | RESPIRATION RATE: 18 BRPM | TEMPERATURE: 98.2 F | HEART RATE: 80 BPM | BODY MASS INDEX: 35.33 KG/M2 | DIASTOLIC BLOOD PRESSURE: 70 MMHG | SYSTOLIC BLOOD PRESSURE: 122 MMHG | HEIGHT: 62 IN

## 2018-03-08 DIAGNOSIS — G89.29 CHRONIC LEFT SHOULDER PAIN: ICD-10-CM

## 2018-03-08 DIAGNOSIS — E11.9 TYPE 2 DIABETES MELLITUS WITHOUT COMPLICATION, WITHOUT LONG-TERM CURRENT USE OF INSULIN (HCC): Primary | ICD-10-CM

## 2018-03-08 DIAGNOSIS — M25.512 CHRONIC LEFT SHOULDER PAIN: ICD-10-CM

## 2018-03-08 PROCEDURE — 99213 OFFICE O/P EST LOW 20 MIN: CPT | Performed by: FAMILY MEDICINE

## 2018-03-08 RX ORDER — CYCLOBENZAPRINE HCL 5 MG
10 TABLET ORAL 3 TIMES DAILY PRN
Qty: 30 TABLET | Refills: 0 | Status: SHIPPED | OUTPATIENT
Start: 2018-03-08 | End: 2018-04-09 | Stop reason: ALTCHOICE

## 2018-03-08 RX ORDER — NAPROXEN SODIUM 500 MG/1
500 TABLET, FILM COATED, EXTENDED RELEASE ORAL
Qty: 30 TABLET | Refills: 1 | Status: SHIPPED | OUTPATIENT
Start: 2018-03-08 | End: 2018-04-09 | Stop reason: ALTCHOICE

## 2018-03-08 NOTE — ASSESSMENT & PLAN NOTE
Shoulder and neck pain from overuse injury at work  Has not tried anything at this point  Muscle spasm on exam noted  Short course of naproxen and cyclobenzaprine  If worsening or not improved PT referral, xray, referral to specialist for injection

## 2018-03-08 NOTE — PROGRESS NOTES
Thresea Kussmaul 1980 female MRN: [id number]    Family Medicine Follow-up Visit    ASSESSMENT/PLAN  Problem List Items Addressed This Visit        Endocrine    Type 2 diabetes mellitus without complication, without long-term current use of insulin ([address] ) - Primary     On metformin 1000 mg BID  HA1C 9 0 from 3/2018  Proteinuria resolved  Patient extremely hesitant to start additional medication at this time  Advised patient on long term side effects and consequences of uncontrolled diabetes  Offered patient nutrition referral, she is not interest at this time  Ideally would start additional PO medication, likely glipizide/glimiperid if patient agreeable at next visit   Otherwise advised on lifestyle changes needed  Will recheck HA1C in 3 months               Other    Chronic left shoulder pain     Shoulder and neck pain from overuse injury at work  Has not tried anything at this point  Muscle spasm on exam noted  Short course of naproxen and cyclobenzaprine  If worsening or not improved PT referral, xray, referral to specialist for injection          Relevant Medications    naproxen (NAPRELAN) 500 MG 24 hr tablet    cyclobenzaprine (FLEXERIL) 5 mg tablet        Follow up in 1 month or sooner if needed       Future Appointments  Date Time Provider Myriam August   4/9/2018 4:00 PM Chilo Aceves MD [facility]          SUBJECTIVE  CC: Diabetes and Shoulder Pain (left shoulder)      HPI:  Thresea Kussmaul is a 45 y o  female who presents for diabetes follow up  She is checking sugars in AM range is 120-250  Taking metformin 1000 mg BID  States she is not drinking soda anymore, drinking only water  Admits to eating a lot of ice cream, sweets     Also complaints of shoulder pain, left over the last 4-5 months  Worse with work when she is moving her arm back and forth a lot  Has not taken anything for it  No trauma  No numbness or tingling  No weakness         HPI    Review of Systems   Constitutional: Negative for chills, fever and unexpected weight change  Respiratory: Negative for cough and wheezing  Cardiovascular: Negative for chest pain and palpitations  Gastrointestinal: Negative for abdominal pain, constipation, diarrhea, nausea and vomiting  Genitourinary: Positive for frequency  Negative for difficulty urinating and dysuria  Musculoskeletal: Negative for arthralgias, back pain and myalgias  Skin: Negative for color change and rash  Neurological: Negative for dizziness, weakness, light-headedness, numbness and headaches  Historical Information   The patient history was reviewed as follows:    History reviewed  No pertinent past medical history    Past Surgical History:   Procedure Laterality Date    ANASTOMOSIS      Last assessed 04/26/17- Oviductal Surgery Tubotubal anastomosis    LAPAROSCOPIC ENDOMETRIOSIS FULGURATION      Twice 2008, 2011 second surgury on tube was "reconstructed" and the other was blocked     Family History   Problem Relation Age of Onset    Diabetes Father       Social History   History   Alcohol Use No     History   Drug Use No     History   Smoking Status    Never Smoker   Smokeless Tobacco    Never Used       Medications:     Current Outpatient Prescriptions:     Glucose Blood (BLOOD GLUCOSE TEST VI), by In Vitro route 2 (two) times a day, Disp: , Rfl:     metFORMIN (GLUCOPHAGE) 1000 MG tablet, Take 1 tablet by mouth every 12 (twelve) hours, Disp: , Rfl:     ONETOUCH VERIO test strip, 2 (two) times a day Test, Disp: , Rfl: 11    cyclobenzaprine (FLEXERIL) 5 mg tablet, Take 2 tablets (10 mg total) by mouth 3 (three) times a day as needed for muscle spasms, Disp: 30 tablet, Rfl: 0    naproxen (NAPRELAN) 500 MG 24 hr tablet, Take 1 tablet (500 mg total) by mouth daily with breakfast, Disp: 30 tablet, Rfl: 1  No Known Allergies    OBJECTIVE    Vitals:   Vitals:    03/08/18 1754   BP: 122/70   Pulse: 80   Resp: 18   Temp: 98 2 °F (36 8 °C)   Weight: 87 1 kg (192 lb)   Height: 5' 2" (1 575 m)           Physical Exam   Constitutional: She is oriented to person, place, and time  She appears well-developed and well-nourished  HENT:   Head: Normocephalic and atraumatic  Mouth/Throat: Oropharynx is clear and moist    Eyes: Pupils are equal, round, and reactive to light  Neck: Normal range of motion  Neck supple  Cardiovascular: Normal rate, regular rhythm and normal heart sounds  Pulmonary/Chest: Effort normal and breath sounds normal  No respiratory distress  She has no wheezes  Abdominal: Soft  Bowel sounds are normal  She exhibits no distension  There is no tenderness  Musculoskeletal: Normal range of motion  She exhibits no edema or tenderness  Cervical back: She exhibits bony tenderness and spasm  Neurological: She is alert and oriented to person, place, and time  Skin: Skin is warm and dry  Psychiatric: She has a normal mood and affect   Her behavior is normal                 Odessa Thomas DO, PGY-3  [facility]   3/8/2018

## 2018-03-08 NOTE — ASSESSMENT & PLAN NOTE
On metformin 1000 mg BID  HA1C 9 0 from 3/2018  Proteinuria resolved  Patient extremely hesitant to start additional medication at this time  Advised patient on long term side effects and consequences of uncontrolled diabetes     Offered patient nutrition referral, she is not interest at this time  Ideally would start additional PO medication, likely glipizide/glimiperid if patient agreeable at next visit   Otherwise advised on lifestyle changes needed  Will recheck HA1C in 3 months

## 2018-03-11 DIAGNOSIS — E11.65 TYPE 2 DIABETES MELLITUS WITH HYPERGLYCEMIA (HCC): ICD-10-CM

## 2018-03-11 DIAGNOSIS — R80.9 PROTEINURIA: ICD-10-CM

## 2018-03-11 DIAGNOSIS — E11.29 TYPE 2 DIABETES MELLITUS WITH OTHER DIABETIC KIDNEY COMPLICATION (CODE): ICD-10-CM

## 2018-04-09 ENCOUNTER — OFFICE VISIT (OUTPATIENT)
Dept: OBGYN CLINIC | Facility: CLINIC | Age: 38
End: 2018-04-09
Payer: COMMERCIAL

## 2018-04-09 VITALS
BODY MASS INDEX: 36.12 KG/M2 | SYSTOLIC BLOOD PRESSURE: 112 MMHG | HEIGHT: 60 IN | WEIGHT: 184 LBS | DIASTOLIC BLOOD PRESSURE: 74 MMHG

## 2018-04-09 DIAGNOSIS — Z01.419 WELL FEMALE EXAM WITH ROUTINE GYNECOLOGICAL EXAM: Primary | ICD-10-CM

## 2018-04-09 PROBLEM — E55.9 VITAMIN D DEFICIENCY: Status: ACTIVE | Noted: 2017-02-06

## 2018-04-09 PROBLEM — R80.9 PROTEINURIA: Status: ACTIVE | Noted: 2017-12-11

## 2018-04-09 PROBLEM — E11.9 DIABETES MELLITUS (HCC): Status: ACTIVE | Noted: 2017-12-11

## 2018-04-09 PROCEDURE — 99395 PREV VISIT EST AGE 18-39: CPT | Performed by: OBSTETRICS & GYNECOLOGY

## 2018-04-09 PROCEDURE — G0145 SCR C/V CYTO,THINLAYER,RESCR: HCPCS | Performed by: OBSTETRICS & GYNECOLOGY

## 2018-04-09 PROCEDURE — 87624 HPV HI-RISK TYP POOLED RSLT: CPT | Performed by: OBSTETRICS & GYNECOLOGY

## 2018-04-09 NOTE — PROGRESS NOTES
ASSESSMENT & PLAN: Daniella Hernadez is a 45 y o  Cherylynn Bolds with normal gynecologic exam     1   Routine well woman exam done today  2  Pap and HPV:  The patient's last pap was normal in 2015  Pap and cotesting was done today  Current ASCCP Guidelines reviewed  3   The following were reviewed in today's visit: breast self exam, family planning choices, adequate intake of calcium and vitamin D, exercise and healthy diet  4  Diabetes- followed by Dr Kim Navarro of family practice  Will repeat hgba1c in 3 months  5  Family planning- had reconstruction on one of the tubes because it was blocked  An HSG afterwards showed that the one of the tubes was still blocked  Will bring in records  Is aware that she must control diabetes prior to getting pregnant  States that her sugars are in the 200's  Had a miscarriage after her HSG, was very early  Reconstruction done through pfannensteil incision   has previously had a semen analysis performed in [facility]  Has been with same partner for 15 years  There was an abnormality with his semen analysis, and was told that he sperm dies quickly  Will need referral to [facility]  Is aware that diabetes needs to be under control prior to attempting pregnancy  WILL CALL THE OFFICE IN THREE MONTHS TO SEE HOW SUGARS ARE  IF BETTER, WILL SEND TO DR Tri Tubbs    CC:  Annual Gynecologic Examination    HPI: Daniella Hernadez is a 45 y o  Cherylynn Bolds who presents for annual gynecologic examination  She has the following concerns:  none    Health Maintenance:    She exercises 0 days per week  She wears her seatbelt routinely  She does not perform regular monthly self breast exams  She feels safe at home  Patients does not follow a particular diet        Her last pap was 2015 and was normal    Past Medical History:   Diagnosis Date    Diabetes mellitus ([address] )     Endometriosis     Female infertility     Miscarriage     Urinary tract infection        Past Surgical History: Procedure Laterality Date    ANASTOMOSIS      Last assessed 17- Oviductal Surgery Tubotubal anastomosis    LAPAROSCOPIC ENDOMETRIOSIS FULGURATION      Twice ,  second surgury on tube was "reconstructed" and the other was blocked    WISDOM TOOTH EXTRACTION         Past OB/Gyn History:  OB History      Para Term  AB Living    1       1      SAB TAB Ectopic Multiple Live Births    1                Period Cycle (Days): 28  Period Duration (Days): 5 days   Period Pattern: Regular  Menstrual Flow: Heavy  Menstrual Control: Thin pad, Maxi pad  Dysmenorrhea: (!) Severe  Dysmenorrhea Symptoms: Cramping  Patient's last menstrual period was 2018 (exact date)  History of sexually transmitted infection No  History of abnormal pap smears  No     Patient is currently sexually active  heterosexual and  monogamous  With   Birth control: none  Family History   Problem Relation Age of Onset    Diabetes Father     Hyperlipidemia Mother    Mittal Asthma Sister     Diabetes Paternal Grandmother        Social History:  Social History     Social History    Marital status: /Civil Union     Spouse name: N/A    Number of children: N/A    Years of education: N/A     Occupational History    Not on file  Social History Main Topics    Smoking status: Never Smoker    Smokeless tobacco: Never Used    Alcohol use No    Drug use: No    Sexual activity: Yes     Partners: Male     Birth control/ protection: None     Other Topics Concern    Not on file     Social History Narrative    No narrative on file     Presently lives with   Patient is     Patient is currently employed [employer]    No Known Allergies    Current Outpatient Prescriptions:     Glucose Blood (BLOOD GLUCOSE TEST VI), by In Vitro route 2 (two) times a day, Disp: , Rfl:     metFORMIN (GLUCOPHAGE) 1000 MG tablet, Take 1 tablet by mouth every 12 (twelve) hours, Disp: , Rfl:     ONETOUCH VERIO test strip, 2 (two) times a day Test, Disp: , Rfl: 11    Review of Systems:  A complete review of systems was performed and was negative, except as listed  none    Physical Exam:  /74   Ht 4' 11 5" (1 511 m)   Wt 83 5 kg (184 lb)   LMP 03/23/2018 (Exact Date)   Breastfeeding? No   BMI 36 54 kg/m²    GEN: The patient was alert and oriented x3, pleasant well-appearing female in no acute distress  HEENT:  Unremarkable, no anterior or posterior lymphadenopathy, no thyromegaly  CV:  RRR, no murmurs  RESP:  Clear to auscultation bilaterally  BREAST:  Symmetric breasts with no palpable breast masses or obvious breast lesions  She has no retractions or nipple discharge  She has no axillary abnormalities or palpable masses  Self breast exam is taught  ABD:  Soft, nontender, nondistended, normoactive bowel sounds,   EXT:  WWP, nontender, no edema  BACK:  No CVA tenderness, no tenderness to palpation along spine  PELVIC:  Normal appearing external female genitalia, normal vaginal epithelium, No discharge  Cervix present   Bimanual: absent CMT,  normal uterus, non-tender  No palpable adnexal masses

## 2018-04-12 ENCOUNTER — OFFICE VISIT (OUTPATIENT)
Dept: FAMILY MEDICINE CLINIC | Facility: CLINIC | Age: 38
End: 2018-04-12
Payer: COMMERCIAL

## 2018-04-12 VITALS
TEMPERATURE: 99.4 F | HEIGHT: 62 IN | HEART RATE: 74 BPM | RESPIRATION RATE: 14 BRPM | DIASTOLIC BLOOD PRESSURE: 78 MMHG | BODY MASS INDEX: 34.19 KG/M2 | WEIGHT: 185.8 LBS | SYSTOLIC BLOOD PRESSURE: 114 MMHG

## 2018-04-12 DIAGNOSIS — E11.9 TYPE 2 DIABETES MELLITUS WITHOUT COMPLICATION, WITHOUT LONG-TERM CURRENT USE OF INSULIN (HCC): Primary | ICD-10-CM

## 2018-04-12 LAB — HPV RRNA GENITAL QL NAA+PROBE: NORMAL

## 2018-04-12 PROCEDURE — 1036F TOBACCO NON-USER: CPT | Performed by: FAMILY MEDICINE

## 2018-04-12 PROCEDURE — 3008F BODY MASS INDEX DOCD: CPT | Performed by: FAMILY MEDICINE

## 2018-04-12 PROCEDURE — 99213 OFFICE O/P EST LOW 20 MIN: CPT | Performed by: FAMILY MEDICINE

## 2018-04-12 RX ORDER — GLIPIZIDE 5 MG/1
5 TABLET ORAL DAILY
Qty: 30 TABLET | Refills: 1 | Status: SHIPPED | OUTPATIENT
Start: 2018-04-12 | End: 2018-06-14 | Stop reason: SDUPTHER

## 2018-04-12 NOTE — PROGRESS NOTES
Phillip Cervantes 1980 female MRN: [id number]    Family Medicine Follow-up Visit    ASSESSMENT/PLAN  Problem List Items Addressed This Visit        Endocrine    Type 2 diabetes mellitus without complication, without long-term current use of insulin ([facility] [address] ) - Primary     On metformin 1000 mg BID  HA1C 9 0 from 3/2018  Proteinuria resolved on recent lab work  Patient now ready to increase medical management as encouraged at last visit  Will start glipizide 5 mg daily  Follow up in 1 month to check how she is tolerating, increase as needed  Again offered nutrition counseling, she is not interested at this time  Will plan office POC HA1C in June          Relevant Medications    glipiZIDE (GLUCOTROL) 5 mg tablet              Future Appointments  Date Time Provider Myriam August   5/10/2018 5:20 PM DO ELD[facility]          SUBJECTIVE  CC: Follow-up      HPI:  Phillip Cervantes is a 45 y o  female who presents for follow up  Here to talk more about her diabetes as she was resistant to starting treatment at our last visit  Testing blood sugars and they are 175-200, sometimes over  Tolerating metformin 1000 mg BID  Now ready to try more medication for her diabetes  Admits her diet is improving but she is not exercising      HPI    Review of Systems   Constitutional: Negative for chills and fever  Eyes: Negative for photophobia and visual disturbance  Respiratory: Negative for cough and shortness of breath  Cardiovascular: Negative for chest pain and palpitations  Gastrointestinal: Negative for abdominal pain, constipation, diarrhea and nausea  Genitourinary: Positive for frequency  Negative for dysuria and flank pain  Neurological: Negative for dizziness, light-headedness and headaches         Historical Information   The patient history was reviewed as follows:    Past Medical History:   Diagnosis Date    Diabetes mellitus ([facility] [address]     Endometriosis     Female infertility     Miscarriage     Urinary tract infection      Past Surgical History:   Procedure Laterality Date    ANASTOMOSIS      Last assessed 04/26/17- Oviductal Surgery Tubotubal anastomosis    LAPAROSCOPIC ENDOMETRIOSIS FULGURATION      Twice 2008, 2011 second surgury on tube was "reconstructed" and the other was blocked    WISDOM TOOTH EXTRACTION       Family History   Problem Relation Age of Onset    Diabetes Father     Hyperlipidemia Mother     Asthma Sister     Diabetes Paternal Grandmother       Social History   History   Alcohol Use No     History   Drug Use No     History   Smoking Status    Never Smoker   Smokeless Tobacco    Never Used       Medications:     Current Outpatient Prescriptions:     Glucose Blood (BLOOD GLUCOSE TEST VI), by In Vitro route 2 (two) times a day, Disp: , Rfl:     metFORMIN (GLUCOPHAGE) 1000 MG tablet, Take 1 tablet by mouth every 12 (twelve) hours, Disp: , Rfl:     ONETOUCH VERIO test strip, 2 (two) times a day Test, Disp: , Rfl: 11    glipiZIDE (GLUCOTROL) 5 mg tablet, Take 1 tablet (5 mg total) by mouth daily, Disp: 30 tablet, Rfl: 1  No Known Allergies    OBJECTIVE    Vitals:   Vitals:    04/12/18 1751   BP: 114/78   BP Location: Right arm   Patient Position: Sitting   Cuff Size: Large   Pulse: 74   Resp: 14   Temp: 99 4 °F (37 4 °C)   Weight: 84 3 kg (185 lb 12 8 oz)   Height: 5' 2" (1 575 m)           Physical Exam   Constitutional: She is oriented to person, place, and time  She appears well-developed and well-nourished  HENT:   Head: Normocephalic and atraumatic  Mouth/Throat: Oropharynx is clear and moist    Eyes: Pupils are equal, round, and reactive to light  Neck: Normal range of motion  Neck supple  Cardiovascular: Normal rate, regular rhythm and normal heart sounds  Pulmonary/Chest: Effort normal and breath sounds normal  No respiratory distress  She has no wheezes  Abdominal: Soft  Bowel sounds are normal  She exhibits no distension   There is no tenderness  Musculoskeletal: Normal range of motion  She exhibits no edema or tenderness  Neurological: She is alert and oriented to person, place, and time  Skin: Skin is warm and dry  Psychiatric: She has a normal mood and affect   Her behavior is normal           Labs:        Deidre Dominguez DO, PGY-3  [facility]   4/12/2018

## 2018-04-12 NOTE — ASSESSMENT & PLAN NOTE
On metformin 1000 mg BID  HA1C 9 0 from 3/2018  Proteinuria resolved on recent lab work  Patient now ready to increase medical management as encouraged at last visit  Will start glipizide 5 mg daily  Follow up in 1 month to check how she is tolerating, increase as needed  Again offered nutrition counseling, she is not interested at this time  Will plan office POC HA1C in June

## 2018-04-13 LAB
LAB AP GYN PRIMARY INTERPRETATION: NORMAL
Lab: NORMAL

## 2018-05-19 ENCOUNTER — APPOINTMENT (OUTPATIENT)
Dept: LAB | Age: 38
End: 2018-05-19
Payer: COMMERCIAL

## 2018-05-19 ENCOUNTER — TRANSCRIBE ORDERS (OUTPATIENT)
Dept: ADMINISTRATIVE | Age: 38
End: 2018-05-19

## 2018-05-19 DIAGNOSIS — N92.0 EXCESSIVE OR FREQUENT MENSTRUATION: Primary | ICD-10-CM

## 2018-05-19 DIAGNOSIS — N92.0 EXCESSIVE OR FREQUENT MENSTRUATION: ICD-10-CM

## 2018-05-19 LAB
ALBUMIN SERPL BCP-MCNC: 3.6 G/DL (ref 3.5–5)
ALP SERPL-CCNC: 59 U/L (ref 46–116)
ALT SERPL W P-5'-P-CCNC: 112 U/L (ref 12–78)
ANION GAP SERPL CALCULATED.3IONS-SCNC: 5 MMOL/L (ref 4–13)
AST SERPL W P-5'-P-CCNC: 54 U/L (ref 5–45)
BILIRUB SERPL-MCNC: 0.5 MG/DL (ref 0.2–1)
BUN SERPL-MCNC: 10 MG/DL (ref 5–25)
CALCIUM SERPL-MCNC: 8.8 MG/DL (ref 8.3–10.1)
CHLORIDE SERPL-SCNC: 105 MMOL/L (ref 100–108)
CO2 SERPL-SCNC: 28 MMOL/L (ref 21–32)
CREAT SERPL-MCNC: 0.63 MG/DL (ref 0.6–1.3)
ERYTHROCYTE [DISTWIDTH] IN BLOOD BY AUTOMATED COUNT: 12.8 % (ref 11.6–15.1)
ESTRADIOL SERPL-MCNC: 21 PG/ML
FSH SERPL-ACNC: 4.9 MIU/ML
GFR SERPL CREATININE-BSD FRML MDRD: 114 ML/MIN/1.73SQ M
GLUCOSE P FAST SERPL-MCNC: 153 MG/DL (ref 65–99)
HCT VFR BLD AUTO: 39.2 % (ref 34.8–46.1)
HGB BLD-MCNC: 13.1 G/DL (ref 11.5–15.4)
MCH RBC QN AUTO: 29 PG (ref 26.8–34.3)
MCHC RBC AUTO-ENTMCNC: 33.4 G/DL (ref 31.4–37.4)
MCV RBC AUTO: 87 FL (ref 82–98)
PLATELET # BLD AUTO: 233 THOUSANDS/UL (ref 149–390)
PMV BLD AUTO: 10.4 FL (ref 8.9–12.7)
POTASSIUM SERPL-SCNC: 3.6 MMOL/L (ref 3.5–5.3)
PROT SERPL-MCNC: 7.3 G/DL (ref 6.4–8.2)
RBC # BLD AUTO: 4.51 MILLION/UL (ref 3.81–5.12)
SODIUM SERPL-SCNC: 138 MMOL/L (ref 136–145)
TESTOST SERPL-MCNC: 28 NG/DL
TSH SERPL DL<=0.05 MIU/L-ACNC: 1.3 UIU/ML (ref 0.36–3.74)
WBC # BLD AUTO: 6.3 THOUSAND/UL (ref 4.31–10.16)

## 2018-05-19 PROCEDURE — 84403 ASSAY OF TOTAL TESTOSTERONE: CPT

## 2018-05-19 PROCEDURE — 82670 ASSAY OF TOTAL ESTRADIOL: CPT

## 2018-05-19 PROCEDURE — 84443 ASSAY THYROID STIM HORMONE: CPT

## 2018-05-19 PROCEDURE — 83001 ASSAY OF GONADOTROPIN (FSH): CPT

## 2018-05-19 PROCEDURE — 36415 COLL VENOUS BLD VENIPUNCTURE: CPT

## 2018-05-19 PROCEDURE — 80053 COMPREHEN METABOLIC PANEL: CPT

## 2018-05-19 PROCEDURE — 85027 COMPLETE CBC AUTOMATED: CPT

## 2018-06-05 DIAGNOSIS — E11.9 TYPE 2 DIABETES MELLITUS WITHOUT COMPLICATION, WITHOUT LONG-TERM CURRENT USE OF INSULIN (HCC): Primary | ICD-10-CM

## 2018-06-06 ENCOUNTER — TELEPHONE (OUTPATIENT)
Dept: FAMILY MEDICINE CLINIC | Facility: CLINIC | Age: 38
End: 2018-06-06

## 2018-06-06 DIAGNOSIS — E11.9 TYPE 2 DIABETES MELLITUS WITHOUT COMPLICATION, WITHOUT LONG-TERM CURRENT USE OF INSULIN (HCC): ICD-10-CM

## 2018-06-06 NOTE — TELEPHONE ENCOUNTER
Pt  came in said pt needs refill on Onetouch Verio test strip, she has about 5 left, please send to [facility]  Thanks

## 2018-06-07 DIAGNOSIS — E11.8 TYPE 2 DIABETES MELLITUS WITH COMPLICATION, WITHOUT LONG-TERM CURRENT USE OF INSULIN (HCC): Primary | ICD-10-CM

## 2018-06-07 RX ORDER — BLOOD SUGAR DIAGNOSTIC
1 STRIP MISCELLANEOUS 2 TIMES DAILY
Qty: 100 EACH | Refills: 0 | Status: SHIPPED | OUTPATIENT
Start: 2018-06-07 | End: 2018-07-17 | Stop reason: SDUPTHER

## 2018-06-14 DIAGNOSIS — E11.9 TYPE 2 DIABETES MELLITUS WITHOUT COMPLICATION, WITHOUT LONG-TERM CURRENT USE OF INSULIN (HCC): ICD-10-CM

## 2018-06-18 RX ORDER — GLIPIZIDE 5 MG/1
5 TABLET ORAL DAILY
Qty: 30 TABLET | Refills: 2 | Status: SHIPPED | OUTPATIENT
Start: 2018-06-18 | End: 2018-09-25 | Stop reason: SDUPTHER

## 2018-06-27 DIAGNOSIS — E11.9 TYPE 2 DIABETES MELLITUS WITHOUT COMPLICATION, WITHOUT LONG-TERM CURRENT USE OF INSULIN (HCC): ICD-10-CM

## 2018-07-17 ENCOUNTER — TELEPHONE (OUTPATIENT)
Dept: FAMILY MEDICINE CLINIC | Facility: CLINIC | Age: 38
End: 2018-07-17

## 2018-07-17 DIAGNOSIS — E11.8 TYPE 2 DIABETES MELLITUS WITH COMPLICATION, WITHOUT LONG-TERM CURRENT USE OF INSULIN (HCC): ICD-10-CM

## 2018-07-17 RX ORDER — BLOOD SUGAR DIAGNOSTIC
1 STRIP MISCELLANEOUS 2 TIMES DAILY
Qty: 100 EACH | Refills: 5 | Status: SHIPPED | OUTPATIENT
Start: 2018-07-17

## 2018-07-17 NOTE — TELEPHONE ENCOUNTER
I am aware of that but  mentioned that Dr Reba Stone said she was sure that Dr Chloé Jensen will be willing to see her if she wasn't here   I will call pt and reschedule with Dr Rashaad Brand , Thanks :)

## 2018-07-17 NOTE — TELEPHONE ENCOUNTER
Is there a reason why patient was scheduled with Dr Rylee Barrera patients are being followed by Dr Idalmis Fox

## 2018-07-17 NOTE — TELEPHONE ENCOUNTER
Pt  came in and said his wife needs a refill for the one touch test strip, can they please have a 3 month supply sent to [facility], has been a week without it,  last seen on 4/12 by Dr Mervin Roberts, scheduled pt for f/u appt  With Dr Mary Grider for this Monday  Please call  Romario when script is sent  And with any questions

## 2018-09-25 DIAGNOSIS — E11.9 TYPE 2 DIABETES MELLITUS WITHOUT COMPLICATION, WITHOUT LONG-TERM CURRENT USE OF INSULIN (HCC): ICD-10-CM

## 2018-09-25 NOTE — TELEPHONE ENCOUNTER
Received refill to renew patient's Glipizide 5mg   Please call patient to schedule diabetes follow up with Dr Joanna Gomes

## 2018-09-26 RX ORDER — GLIPIZIDE 5 MG/1
5 TABLET ORAL DAILY
Qty: 30 TABLET | Refills: 2 | Status: SHIPPED | OUTPATIENT
Start: 2018-09-26 | End: 2018-12-14 | Stop reason: SDUPTHER

## 2018-12-05 ENCOUNTER — OFFICE VISIT (OUTPATIENT)
Dept: FAMILY MEDICINE CLINIC | Facility: CLINIC | Age: 38
End: 2018-12-05
Payer: COMMERCIAL

## 2018-12-05 VITALS
WEIGHT: 188.6 LBS | HEART RATE: 76 BPM | HEIGHT: 62 IN | DIASTOLIC BLOOD PRESSURE: 80 MMHG | RESPIRATION RATE: 14 BRPM | SYSTOLIC BLOOD PRESSURE: 116 MMHG | BODY MASS INDEX: 34.71 KG/M2 | TEMPERATURE: 99 F

## 2018-12-05 DIAGNOSIS — M25.512 CHRONIC LEFT SHOULDER PAIN: ICD-10-CM

## 2018-12-05 DIAGNOSIS — T14.8XXA MUSCLE STRAIN: Primary | ICD-10-CM

## 2018-12-05 DIAGNOSIS — G89.29 CHRONIC LEFT SHOULDER PAIN: ICD-10-CM

## 2018-12-05 PROCEDURE — 99213 OFFICE O/P EST LOW 20 MIN: CPT | Performed by: FAMILY MEDICINE

## 2018-12-05 PROCEDURE — 3008F BODY MASS INDEX DOCD: CPT | Performed by: FAMILY MEDICINE

## 2018-12-05 RX ORDER — INSULIN ASPART 100 [IU]/ML
INJECTION, SOLUTION INTRAVENOUS; SUBCUTANEOUS
Refills: 3 | COMMUNITY
Start: 2018-09-21

## 2018-12-05 RX ORDER — NAPROXEN 500 MG/1
250 TABLET ORAL 2 TIMES DAILY WITH MEALS
Qty: 14 TABLET | Refills: 0 | Status: SHIPPED | OUTPATIENT
Start: 2018-12-05 | End: 2019-03-25

## 2018-12-05 RX ORDER — NORETHINDRONE AND ETHINYL ESTRADIOL 1 MG-35MCG
KIT ORAL
Refills: 4 | COMMUNITY
Start: 2018-11-30

## 2018-12-05 RX ORDER — METHOCARBAMOL 500 MG/1
500 TABLET, FILM COATED ORAL 3 TIMES DAILY
Qty: 6 TABLET | Refills: 0 | Status: SHIPPED | OUTPATIENT
Start: 2018-12-05 | End: 2018-12-07

## 2018-12-05 RX ORDER — FERROUS SULFATE 325(65) MG
1 TABLET ORAL DAILY
Refills: 11 | COMMUNITY
Start: 2018-11-15

## 2018-12-05 NOTE — LETTER
December 5, 2018     Patient: Lisa Lo   YOB: 1980   Date of Visit: 12/5/2018       To Whom it May Concern:    Lisa Lo is under my professional care  She was seen in my office on 12/5/2018  She may return to work on 12/5/18  Patient needs to rest shoulder/ shoulder usage for 3 days  If you have any questions or concerns, please don't hesitate to call         Sincerely,          Carina Solano MD        CC: No Recipients

## 2018-12-05 NOTE — PROGRESS NOTES
Cinthia Ceballos 1980 female MRN: [id number]    Family Medicine Acute Visit    ASSESSMENT/PLAN   Problem List Items Addressed This Visit     Chronic left shoulder pain     Shoulder pain acute on chronic in addition to neck pain: 2n2 overuse   Likely rotator cuff tendonitis     1  Treat conservatively: RICE  2  Naproxen 500mg BID for 14 days  3  Exercise papers given to patient          Muscle strain - Primary    Relevant Medications    naproxen (NAPROSYN) 500 mg tablet    methocarbamol (ROBAXIN) 500 mg tablet              No future appointments  SUBJECTIVE  CC: Shoulder Pain      HPI:  Cinthia Ceballos is a 45 y o  female who presents for acute visit:      1  Left shoulder pain:  Started one month ago; worsening    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   * has been doing same repatitive motion for 1 year and 2 months    Pain: sharp   Pain:  7/10  Aggravates it: any movement , at rest  Alleviates it: nothing  Medications: motrin 200mg BID for a couple of days: did not help  Denies fever, chills, trauma, fall      Review of Systems   Constitutional: Negative for activity change and chills  HENT: Negative for sinus pressure  Eyes: Negative for visual disturbance  Respiratory: Negative for apnea, cough, choking, chest tightness, shortness of breath, wheezing and stridor  Cardiovascular: Negative for chest pain, palpitations and leg swelling  Gastrointestinal: Negative for abdominal distention, abdominal pain, anal bleeding, diarrhea, nausea and vomiting  Endocrine: Negative for polyuria  Genitourinary: Negative for difficulty urinating  Musculoskeletal: Positive for neck pain and neck stiffness  Neck and shoulder pain   Skin: Negative for color change, rash and wound  Allergic/Immunologic: Negative for immunocompromised state  Psychiatric/Behavioral: Negative for agitation, behavioral problems, sleep disturbance and suicidal ideas  The patient is not nervous/anxious  Historical Information   The patient history was reviewed and updated as follows:  Past Medical History:   Diagnosis Date    Diabetes mellitus ([facility] [address] )     Endometriosis     Female infertility     Miscarriage     Urinary tract infection          Past Surgical History:   Procedure Laterality Date    ANASTOMOSIS      Last assessed 04/26/17- Oviductal Surgery Tubotubal anastomosis    LAPAROSCOPIC ENDOMETRIOSIS FULGURATION      Twice 2008, 2011 second surgury on tube was "reconstructed" and the other was blocked    WISDOM TOOTH EXTRACTION       Family History   Problem Relation Age of Onset    Diabetes Father     Hyperlipidemia Mother     Asthma Sister     Diabetes Paternal Grandmother       Social History   History   Alcohol Use No     History   Drug Use No     History   Smoking Status    Never Smoker   Smokeless Tobacco    Never Used       Medications:     Current Outpatient Prescriptions:     glipiZIDE (GLUCOTROL) 5 mg tablet, Take 1 tablet (5 mg total) by mouth daily, Disp: 30 tablet, Rfl: 2    Glucose Blood (BLOOD GLUCOSE TEST VI), by In Vitro route 2 (two) times a day, Disp: , Rfl:     metFORMIN (GLUCOPHAGE) 1000 MG tablet, Take 1/2 tablet with morning meal and one whole tablet with evening meal, Disp: 135 tablet, Rfl: 0    ONETOUCH VERIO test strip, 1 each by Other route 2 (two) times a day Test, Disp: 100 each, Rfl: 5    ALYACEN 1/35 1-35 MG-MCG per tablet, TAKE 1 TABLET BY MOUTH ONCE DAILY (SKIP PLACEBO PILLS), Disp: , Rfl: 4    ferrous sulfate 325 (65 Fe) mg tablet, Take 1 tablet by mouth daily, Disp: , Rfl: 11    methocarbamol (ROBAXIN) 500 mg tablet, Take 1 tablet (500 mg total) by mouth 3 (three) times a day for 2 days, Disp: 6 tablet, Rfl: 0    naproxen (NAPROSYN) 500 mg tablet, Take 0 5 tablets (250 mg total) by mouth 2 (two) times a day with meals for 14 days, Disp: 14 tablet, Rfl: 0    NOVOLOG FLEXPEN 100 units/mL injection pen, INJECT 4 UNITS UNDER THE SKIN 3 (THREE) TIMES A DAY BEFORE MEALS , Disp: , Rfl: 3    No Known Allergies    OBJECTIVE  Vitals:   Vitals:    12/05/18 1612   BP: 116/80   BP Location: Right arm   Patient Position: Sitting   Cuff Size: Large   Pulse: 76   Resp: 14   Temp: 99 °F (37 2 °C)   TempSrc: Tympanic   Weight: 85 5 kg (188 lb 9 6 oz)   Height: 5' 2" (1 575 m)         Physical Exam   Constitutional: She is oriented to person, place, and time  She appears well-developed and well-nourished  No distress  HENT:   Head: Normocephalic  Eyes: Pupils are equal, round, and reactive to light  Neck: Normal range of motion  Cardiovascular: Normal rate  Pulmonary/Chest: Effort normal    Abdominal: Soft  She exhibits no distension  Musculoskeletal:        Left shoulder: She exhibits tenderness and bony tenderness  She exhibits normal range of motion, no deformity, no laceration and no spasm  Cervical back: She exhibits spasm  She exhibits normal range of motion and no bony tenderness  Neurological: She is alert and oriented to person, place, and time  Skin: No rash noted  She is not diaphoretic  No erythema  No pallor  Psychiatric: She has a normal mood and affect   Her behavior is normal  Judgment and thought content normal           Naz Fischer MD, PGY-3  [facility]   12/6/2018

## 2018-12-05 NOTE — PATIENT INSTRUCTIONS
Tendinitis del manguito rotador del hombro   LO QUE NECESITA SABER:   La tendinitis del manguito rotador es la inflamación de los tendones de la articulación del hombro  Los tendones son cuerdas de tejido resistente que [employer] músculos a los [place]  El manguito rotador está formado por un daniel de músculos y tendones que mantienen la articulación del hombro en cornelius lugar  INSTRUCCIONES SOBRE EL MEGAN HOSPITALARIA: Tendinitis   LO QUE NECESITA SABER:   La tendinitis es ralph inflamación dolorosa o desprendimiento de los tendones  También se le conoce xu tendinopatía  La tendinitis analisa siempre ocurre en la rodilla, hombro, tobillo, cadera o codo  INSTRUCCIONES SOBRE EL MEGAN HOSPITALARIA:   Medicamentos:   · Los [employer] AINEs y el acetaminofén podrían disminuir la inflamación y el dolor  Estos medicamentos están disponibles sin receta médica  Pregunte qué medicina priti  Pregunte cuánto priti y cuándo  [address]  Los AINES y el acetaminofén podrían causar daños en el hígado o riñón si no se keith correctamente  Si usted chani un medicamento anticoagulante, asegúrese de preguntarle a cornelius médico si los PRISCILA son seguros para usted  Cheryl siempre las instrucciones en la etiqueta del medicamento y Baker Rylee indicaciones antes de usar el [place]  · [place] luz elena medicamentos xu se le haya indicado  Consulte con cornelius médico si usted christopher que cornelius medicamento no le está ayudando o si presenta efectos secundarios  Infórmele si es alérgico a cualquier medicamento  Mantenga ralph lista actualizada de los [place], las vitaminas y los productos herbales que chani  Incluya los siguientes datos de los medicamentos: cantidad, frecuencia y motivo de administración  Traiga con usted la lista o los envases de la píldoras a luz elena citas de seguimiento  Lleve la lista de los medicamentos con usted en maria del rosario de ralph emergencia    Control:   · Descanse  el tendón según las indicaciones de cornelius médico para ayudarlo a sanar  Pregúntele a cornelius médico si debe evitar apoyar [employer]  · El hielo  ayuda a disminuir la inflamación y el dolor  El hielo también puede contribuir a evitar el daño de los tejidos  Use un paquete de hielo o ponga hielo molido dentro de [employer]  [place] la bolsa con ralph tolla y colóquela sobre el área afectada por 10 a 15 minutos por hora o según indique el médico     · Los dispositivos de [employer] un bastón, ralph [place], ralph plantilla para zapatos o un yeso podrían ayudar a reducir el dolor  · Fisioterapia  es posible que cornelius médico se lo pida  La terapia física podría usarse para enseñarle a usted ejercicios para ayudarle a mejorar el movimiento y la fuerza, y para disminuir el dolor  Usted también podría aprender a mejorar cornelius postura y formas de levantarse o ejercitarse correctamente  Prevención:   · Estire o caliente  antes de realizar ralph actividad física  · Debby ejercicio regularmente  para fortalecer los músculos que están alrededor de cornelius articulación  Acostúmbrese a ralph rutina de ejercicios por las primeras 3 semanas para evitar otra lesión  Pregunte a cornelius médico acerca del mejor plan de ejercicio para usted  Descanse por completo después de cada actividad que realice  · Use el equipo correcto  para practicar deportes y hacer ejercicio  Use ralph férula o cinta especial para envolver luz elena articulaciones débiles según indicaciones médicas  Acuda a luz elena consultas de control con cornelius médico según le indicaron  Anote luz elena preguntas para que se acuerde de hacerlas kallie luz elena visitas  Pregúntele a cornelius Terrilyn Jeff vitaminas y minerales son adecuados para usted  · Usted tiene más dolor aun después de tomarse luz elena medicamentos  · Usted tiene preguntas o inquietudes acerca de cornelius condición o cuidado  Regrese a la khloe de emergencias si:   · Usted tiene más enrojecimiento alrededor de la articulación o inflamación en la articulación      · De repente, no puede  scott articulación  © 2017 26[address] Jonathan العلي Information is for End User's use only and may not be sold, redistributed or otherwise used for commercial purposes  All illustrations and images included in CareNotes® are the copyrighted property of A D A M , Inc  or Kosta Duggan  Esta información es sólo para uso en educación  Scott intención no es darle un consejo médico sobre enfermedades o tratamientos  Colsulte con scott Rosa Rand farmacéutico antes de seguir cualquier régimen médico para saber si es seguro y efectivo para usted  Medicamentos:   · AINEs (analgésicos antiinflamatorios no esteroides):  Estos medicamentos disminuyen la inflamación y el dolor  Los AINEs se pueden obtener sin receta médica  Consulte con scott médico cuál medicamento es el adecuado para usted  Pregunte cuánto priti y cuándo  Tómelos xu se le indique  Cuando no se keith de la [employer], los medicamentos antiinflamatorios no esteroides pueden causar sangrado estomacal o problemas renales  · Esteroides:  Es posible que le inyecten lorin medicamento en el área del manguito rotador para bajar la inflamación y calmar el dolor  · [place] luz elena medicamentos xu se le haya indicado  Consulte con scott médico si usted christopher que scott medicamento no le está ayudando o si presenta efectos secundarios  Infórmele si es alérgico a cualquier medicamento  Mantenga ralph lista actualizada de los [place], las vitaminas y los productos herbales que chani  Incluya los siguientes datos de los medicamentos: cantidad, frecuencia y motivo de administración  Traiga con usted la lista o los envases de la píldoras a luz elena citas de seguimiento  Lleve la lista de los medicamentos con usted en maria del rosario de ralph emergencia  Debby ralph srinivasan de seguimiento con scott médico u ortopedista xu se le indique:  Anote luz elena preguntas para que se acuerde de hacerlas kallie luz elena visitas    Cuidados personales:   · Descanse:  Limite la actividad en el hombro afectado para reducir la presión en el tendón  Es posible que esto contribuya a evitar daños mayores, aliviar el dolor y a que se sane  · Hielo:  El hielo ayuda a disminuir la inflamación y el dolor  El hielo también puede contribuir a evitar el daño de los tejidos  Use un paquete de hielo o ponga hielo molido dentro de [employer]  Envuelva el hielo con Alfred Angela y colóquelo sobre el hombro kallie 15 a 20 minutos cada hora o xu le indiquen  · Posición del hombro: Mantenga el hombro en la posición correcta para que se sane más rápidamente  Para lograrlo, puede subir la altura de los brazos de los asientos cuando Garrett Rife y está sentado  Trate de no dormir del lado del hombro lesionado  Si es [place], use un brasier deportivo para que los tirantes estén más cerca de cornelius catherine  [place] puede quitar presión del hombro afectado  Fisioterapia:  Un fisioterapeuta puede enseñarle ejercicios que contribuyan a mejorar cornelius movimiento y fuerza, y a aliviar el dolor  Es posible que con estos ejercicios pueda volver a  el hombro de forma normal y fortalecer el manguito rotador  Puede que también aprenda otros ejercicios para estirar y fortalecer los músculos del hombro  Es posible que aprenda a modificar la manera en que hace florencia actividades diarias para ayudar a reducir la tensión en los tendones del hombro  Comuníquese con cornelius médico u ortopedista si:   · Usted tiene fiebre  · Usted tiene dolor e inflamación en el hombro, incluso después de priti el medicamento para el dolor  · Usted tiene comezón, hinchazón o sarpullido en cornelius piel  · Florencia síntomas no mejoran, o empeoran  · Usted tiene preguntas o inquietudes acerca de cornelius condición o cuidado  Regrese a la khloe de emergencias si:   · Usted tiene falta de aire o dolor de pecho repentinos  · Pierde la sensación, siente un hormigueo o se le pone fría, brielle o pálida alguna parte de cornelius brazo    © 2017 2600 Jonathan اللعي Information is for End User's use only and may not be sold, redistributed or otherwise used for commercial purposes  All illustrations and images included in CareNotes® are the copyrighted property of A D A M , Inc  or Kosta Duggan  Esta información es sólo para uso en educación  Cornelius intención no es darle un consejo médico sobre enfermedades o tratamientos  Colsulte con cornelius Jennifer Poncho farmacéutico antes de seguir cualquier régimen médico para saber si es seguro y efectivo para usted

## 2018-12-14 DIAGNOSIS — E11.9 TYPE 2 DIABETES MELLITUS WITHOUT COMPLICATION, WITHOUT LONG-TERM CURRENT USE OF INSULIN (HCC): ICD-10-CM

## 2018-12-14 RX ORDER — GLIPIZIDE 5 MG/1
TABLET ORAL
Qty: 30 TABLET | Refills: 2 | Status: SHIPPED | OUTPATIENT
Start: 2018-12-14

## 2019-02-22 ENCOUNTER — OFFICE VISIT (OUTPATIENT)
Dept: FAMILY MEDICINE CLINIC | Facility: CLINIC | Age: 39
End: 2019-02-22

## 2019-02-22 VITALS
HEIGHT: 62 IN | SYSTOLIC BLOOD PRESSURE: 112 MMHG | WEIGHT: 186.8 LBS | TEMPERATURE: 98.5 F | DIASTOLIC BLOOD PRESSURE: 88 MMHG | RESPIRATION RATE: 16 BRPM | BODY MASS INDEX: 34.37 KG/M2 | HEART RATE: 96 BPM

## 2019-02-22 DIAGNOSIS — M25.512 CHRONIC LEFT SHOULDER PAIN: Primary | ICD-10-CM

## 2019-02-22 DIAGNOSIS — G89.29 CHRONIC LEFT SHOULDER PAIN: Primary | ICD-10-CM

## 2019-02-22 PROCEDURE — 99213 OFFICE O/P EST LOW 20 MIN: CPT | Performed by: FAMILY MEDICINE

## 2019-02-22 RX ORDER — NAPROXEN 500 MG/1
500 TABLET ORAL 2 TIMES DAILY WITH MEALS
Qty: 20 TABLET | Refills: 0 | Status: SHIPPED | OUTPATIENT
Start: 2019-02-22 | End: 2019-04-01

## 2019-02-22 NOTE — ASSESSMENT & PLAN NOTE
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

## 2019-02-22 NOTE — PROGRESS NOTES
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  Patient completed 14 days course of Naproxen with no lasting improvement early Dec 2018  Will refill Naproxen 500 mg PO BID  Referral to Physical Therapy, if symptoms persist will consider referral to ortho  F/u with PCP to obtain letter for possible reassignment at place of work         Relevant Medications    naproxen (NAPROSYN) 500 mg tablet    Other Relevant Orders    Ambulatory referral to Physical Therapy        Chief Complaint:  Chief Complaint   Patient presents with    Shoulder Pain     left side several months     HPI:   Mark Silva is a 44 y o  female here for f/u of chronic left shoulder pain  She was last seen early Dec 2018 and prescribed Naproxen 500 mg PO BID x10 days and exercise regimen  She's completed 10 day course, but has not been able to rest and continued with overuse of left shoulder at work and reports symptoms persist      History:   Current Outpatient Medications   Medication Sig Dispense Refill    ferrous sulfate 325 (65 Fe) mg tablet Take 1 tablet by mouth daily  11    Glucose Blood (BLOOD GLUCOSE TEST VI) by In Vitro route 2 (two) times a day      metFORMIN (GLUCOPHAGE) 1000 MG tablet Take 1/2 tablet with morning meal and one whole tablet with evening meal 135 tablet 0    NOVOLOG FLEXPEN 100 units/mL injection pen INJECT 4 UNITS UNDER THE SKIN 3 (THREE) TIMES A DAY BEFORE MEALS    3    ONETOUCH VERIO test strip 1 each by Other route 2 (two) times a day Test 100 each 5    ALYACEN 1/35 1-35 MG-MCG per tablet TAKE 1 TABLET BY MOUTH ONCE DAILY (SKIP PLACEBO PILLS)  4    glipiZIDE (GLUCOTROL) 5 mg tablet TAKE 1 TABLET BY MOUTH EVERY DAY (Patient not taking: Reported on 2/22/2019) 30 tablet 2    methocarbamol (ROBAXIN) 500 mg tablet Take 1 tablet (500 mg total) by mouth 3 (three) times a day for 2 days 6 tablet 0    naproxen (NAPROSYN) 500 mg tablet Take 0 5 tablets (250 mg total) by mouth 2 (two) times a day with meals for 14 days 14 tablet 0    naproxen (NAPROSYN) 500 mg tablet Take 1 tablet (500 mg total) by mouth 2 (two) times a day with meals for 10 days 20 tablet 0     No current facility-administered medications for this visit  Past Medical History:   Diagnosis Date    Diabetes mellitus ([facility] )     Endometriosis     Female infertility     Miscarriage     Urinary tract infection      Social History     Tobacco Use    Smoking status: Never Smoker    Smokeless tobacco: Never Used   Substance Use Topics    Alcohol use: No    Drug use: No     Patient Active Problem List   Diagnosis    Type 2 diabetes mellitus without complication, without long-term current use of insulin ([facility]    Chronic left shoulder pain    Acanthosis nigricans    Diabetes mellitus ([facility] )    Female infertility    Glucosuria    Hyperlipidemia    Obesity    Proteinuria    Type 2 diabetes mellitus, uncontrolled, with renal complications ([facility])    Vitamin D deficiency    Muscle strain     ROS:  Review of Systems   Musculoskeletal: Positive for arthralgias  Negative for joint swelling and myalgias  Skin: Negative for rash  Neurological: Negative for weakness and numbness  Physical Exam:  /88 (BP Location: Right arm, Patient Position: Sitting, Cuff Size: Adult)   Pulse 96   Temp 98 5 °F (36 9 °C) (Tympanic)   Resp 16   Ht 5' 2" (1 575 m)   Wt 84 7 kg (186 lb 12 8 oz)   BMI 34 17 kg/m²   Physical Exam   Constitutional: No distress  HENT:   Head: Normocephalic and atraumatic  Cardiovascular: Normal rate, regular rhythm and normal heart sounds  No murmur heard  Pulmonary/Chest: Effort normal and breath sounds normal  No respiratory distress  She has no wheezes  Abdominal: Soft   Normal appearance and bowel sounds are normal  There is no tenderness  Neurological: She is alert  Skin: She is not diaphoretic  Vitals reviewed  Shoulder Exam Left:  Positive Empty can test   Positive Reina test  Negative Cross arm test  ROM on both passive and active abduction is limited due to pain      No future appointments      Yara Denton MD

## 2019-02-24 ENCOUNTER — TELEPHONE (OUTPATIENT)
Dept: FAMILY MEDICINE CLINIC | Facility: CLINIC | Age: 39
End: 2019-02-24

## 2019-02-24 NOTE — LETTER
February 24, 2019     Patient: Ekaterina Bliss   YOB: 1980   Date of Visit: 2/22/2019       To Whom It May Concern: It is my medical opinion that Ekaterina Bliss would greatly benefit from reassignment to light duty where she can avoid repetitive strain on her left shoulder for the next four weeks, in order to allow her left shoulder to fully heal     If you have any questions or concerns, please don't hesitate to call           Sincerely,        Grzegorz Villarreal MD    CC: No Recipients

## 2019-02-25 NOTE — TELEPHONE ENCOUNTER
Work letter recommending light duty work prepared for patient  Please call patient and let her know  The letter contains medical information regarding her left shoulder pain but I have already spoken to her and she has given me permission to release this information to her employer, so if she would like us to fax the letter directly to her employer that is also an option  Thank you!   Jase

## 2019-03-06 ENCOUNTER — EVALUATION (OUTPATIENT)
Dept: PHYSICAL THERAPY | Age: 39
End: 2019-03-06
Payer: COMMERCIAL

## 2019-03-06 DIAGNOSIS — M25.512 ACUTE PAIN OF LEFT SHOULDER: Primary | ICD-10-CM

## 2019-03-06 PROCEDURE — 97110 THERAPEUTIC EXERCISES: CPT | Performed by: PHYSICAL THERAPIST

## 2019-03-06 PROCEDURE — G8990 OTHER PT/OT CURRENT STATUS: HCPCS | Performed by: PHYSICAL THERAPIST

## 2019-03-06 PROCEDURE — 97162 PT EVAL MOD COMPLEX 30 MIN: CPT | Performed by: PHYSICAL THERAPIST

## 2019-03-06 PROCEDURE — G8991 OTHER PT/OT GOAL STATUS: HCPCS | Performed by: PHYSICAL THERAPIST

## 2019-03-06 NOTE — PROGRESS NOTES
PT Evaluation     Today's date: 3/6/2019  Patient name: Fortino Gomez  : 1980  MRN: [id number]  Referring provider: Felice Kearney MD  Dx:   Encounter Diagnosis     ICD-10-CM    1  Acute pain of left shoulder M25 512        Start Time: 1515  Stop Time: 1550  Total time in clinic (min): 35 minutes    Assessment  Assessment details: 44year old female patient reports to PT with acute L shoulder pain  Patient's primary impairments seems to be decreased strength of proximal L shoulder and scapular musculature and decreased mobility of L first rib, as post mobilizations, patient had increased shoulder ROM and decreased pain  Patient had full and pain free L shoulder PROM  Patient will benefit from skilled PT services to address current impairments and functional limitations to help patient return to her PLOF  Impairments: abnormal or restricted ROM, abnormal movement, activity intolerance, impaired physical strength and pain with function    Symptom irritability: lowUnderstanding of Dx/Px/POC: good   Prognosis: good    Goals  STG  1  Patient will be independent with completion of HEP throughout therapy  2  Patient will have at worst 6/10 pain so patient can sleep with less discomfort in 3 weeks  LTG  1  Patient will increase L proximal shoulder strength by at least 1/2 grade so patient can lift with less difficulty in 6 weeks  2  Patient will increase L shoulder elevation ROM to WNL so patient can complete overhead activity with less difficulty in 6 weeks       Plan  Patient would benefit from: skilled physical therapy  Planned therapy interventions: joint mobilization, manual therapy, neuromuscular re-education, patient education, stretching, strengthening, therapeutic exercise, therapeutic activities, home exercise program, functional ROM exercises and flexibility  Frequency: 2x week  Duration in weeks: 6        Subjective Evaluation    History of Present Illness  Mechanism of injury: Patient reports with L shoulder pain that began in December  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  Patient denies numbness/tingling  Patient notes she has difficulty sleeping on her L side, as well as lifting, and completing overhead activities  Patient is currently on light duty  Pain  Current pain ratin  At best pain ratin  At worst pain ratin  Location: L shoulder  Quality: dull ache  Relieving factors: medications and change in position  Aggravating factors: lifting and overhead activity    Treatments  Current treatment: physical therapy  Patient Goals  Patient goals for therapy: decreased pain, increased motion, return to work and increased strength          Objective     Tenderness     Left Shoulder   Tenderness in the biceps tendon (proximal) and supraspinatus tendon  Active Range of Motion   Cervical/Thoracic Spine       Cervical    Flexion:  WFL  Extension:  WFL  Left rotation:  [facility] and with pain  Right rotation:  WFL  Left Shoulder   Flexion: 160 degrees with pain  Abduction: 135 degrees with pain    Right Shoulder   Flexion: WFL  Abduction: WFL    Passive Range of Motion   Left Shoulder   Flexion: WFL  Abduction: WFL  External rotation 90°: WFL  Internal rotation 90°: WFL    Right Shoulder   Flexion: WFL  Abduction: WFL  External rotation 90°: WFL  Internal rotation 90°: [facility]    Joint Play   Left Shoulder  Hypomobile in the 1st rib  Strength/Myotome Testing     Left Shoulder     Planes of Motion   Flexion: 3+   Abduction: 3+   External rotation at 0°: 3+   Internal rotation at 0°: 4     Isolated Muscles   Lower trapezius: 3+   Middle trapezius: 3+   Serratus anterior: 3+     Right Shoulder     Planes of Motion   Flexion: 3+   Abduction: 3+   External rotation at 0°: 4   Internal rotation at 0°: 4     Isolated Muscles   Lower trapezius: 3+   Middle trapezius: 3+   Serratus anterior: 3+     Tests     Left Shoulder   Positive painful arc and passive horizontal adduction  Negative belly press, drop arm, empty can, external rotation lag sign, full can and Hawkin's         Flowsheet Rows      Most Recent Value   PT/OT G-Codes   Current Score  47   Projected Score  69   FOTO information reviewed  Yes   Assessment Type  Evaluation   G code set  Other PT/OT Primary   Other PT Primary Current Status ()  CK   Other PT Primary Goal Status ()  CJ          Precautions: DM      Daily Treatment Diary     Manual              L first rib mob                                                                     Exercise Diary              Supine thoracic extension over towel             sidelying shoulder ER L             Prone low trap retraction                          UBE             Supine B shoulder flexion w/ cane over vertical bolsters If tolerated            Supine serratus punches             Ball on wall             No moneys                                                                                                                                                                 Modalities

## 2019-03-11 ENCOUNTER — OFFICE VISIT (OUTPATIENT)
Dept: PHYSICAL THERAPY | Age: 39
End: 2019-03-11
Payer: COMMERCIAL

## 2019-03-11 DIAGNOSIS — M25.512 ACUTE PAIN OF LEFT SHOULDER: Primary | ICD-10-CM

## 2019-03-11 PROCEDURE — 97110 THERAPEUTIC EXERCISES: CPT | Performed by: PHYSICAL THERAPIST

## 2019-03-11 PROCEDURE — 97112 NEUROMUSCULAR REEDUCATION: CPT | Performed by: PHYSICAL THERAPIST

## 2019-03-11 PROCEDURE — 97140 MANUAL THERAPY 1/> REGIONS: CPT | Performed by: PHYSICAL THERAPIST

## 2019-03-11 NOTE — PROGRESS NOTES
Daily Note     Today's date: 3/11/2019  Patient name: Cris Flores  : 1980  MRN: [id number]  Referring provider: Edwige Monique MD  Dx:   Encounter Diagnosis     ICD-10-CM    1  Acute pain of left shoulder M25 512                   Subjective: Patient notes not much improvement since IE  Has pain when looking to L  Objective: See treatment diary below    Precautions: DM        Daily Treatment Diary      Manual    3/11                    L first rib mob                                                L cervical sideglides   8 min lower cervical Gr III/IV                                                                          Exercise Diary    3/11                   Supine thoracic extension over towel    2 min                    sidelying shoulder ER L    2x12                   Prone low trap retraction    2x10                                            UBE    6 min                    Supine B shoulder flexion w/ cane over vertical bolsters If tolerated  do next time                    Supine serratus punches    2x10                    Ball on wall    20x 2 directions                   No moneys     2x10                     wall slides   If pain free do next time                                                                                                                                                                                                                                                                         Modalities                                                                                                          Assessment: Tolerated treatment well  Patient had increased L cervical rotation post cervical side glides  Patient had no increase in shoulder pain post treatment, but showed signs of fatigue by end of strengthening exercises  Patient had no pain with shoulder flexion, which was almost WNL, patient was still limited with shoulder abduction        Plan: Progress treatment as tolerated

## 2019-03-25 ENCOUNTER — APPOINTMENT (OUTPATIENT)
Dept: PHYSICAL THERAPY | Age: 39
End: 2019-03-25
Payer: COMMERCIAL

## 2019-03-25 ENCOUNTER — TELEPHONE (OUTPATIENT)
Dept: FAMILY MEDICINE CLINIC | Facility: CLINIC | Age: 39
End: 2019-03-25

## 2019-03-25 ENCOUNTER — OFFICE VISIT (OUTPATIENT)
Dept: FAMILY MEDICINE CLINIC | Facility: CLINIC | Age: 39
End: 2019-03-25

## 2019-03-25 VITALS
TEMPERATURE: 99.2 F | DIASTOLIC BLOOD PRESSURE: 88 MMHG | BODY MASS INDEX: 34.37 KG/M2 | HEART RATE: 80 BPM | HEIGHT: 62 IN | WEIGHT: 186.8 LBS | SYSTOLIC BLOOD PRESSURE: 110 MMHG | RESPIRATION RATE: 16 BRPM

## 2019-03-25 DIAGNOSIS — G89.29 CHRONIC LEFT SHOULDER PAIN: Primary | ICD-10-CM

## 2019-03-25 DIAGNOSIS — M25.512 CHRONIC LEFT SHOULDER PAIN: Primary | ICD-10-CM

## 2019-03-25 PROCEDURE — 99213 OFFICE O/P EST LOW 20 MIN: CPT | Performed by: FAMILY MEDICINE

## 2019-03-25 NOTE — PROGRESS NOTES
Cinthia Ceballos 1980 female MRN: [id number]    Family Medicine Visit    ASSESSMENT/PLAN  Problem List Items Addressed This Visit        Other    Chronic left shoulder pain - Primary     -Patient completed 4 weeks of light duty with NSAID use and PT  -Per PT records, patient has made notable progress with L shoulder pain, but is still limited with shoulder abduction  -Reina-Abram test (+) today for pain in [facility], operated by [facility] joint of L shoulder  -Patient reports subjective improvement to symptoms and would like to return back to full duty at work at this time  -However, discussed with patient that 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 her L shoulder pain, and her symptoms may return with continued overuse activity  -Advised patient she may be at risk for developing worsening of inflammation and/or injurious progression with continued overuse, and she should try to rest when symptomatic of shoulder pain, rather than choosing to "push through the pain"  -Wrote letter for patient allowing her to return to full duty as tolerated, but with caution for patient to start back slowly to avoid setbacks in the progress she has made  -Encouraged patient to consider discussing with HR department at her employment regarding whether FMLA paperwork might be a good option for her, as FMLA may allow patient to choose intermittent light duty privileges and/or L shoulder rest during acute exacerbation of symptoms   -Discussed application of ice/heat, topical treatments such as lidocaine, capsaicin, etc , and PRN PO options such as tylenol & NSAIDs as additional treatment modalities                  Follow up PRN with PCP  SUBJECTIVE  CC: Follow-up (pt needs a release from light duty for work)      HPI:  Cinthia Ceballos is a 44 y o  female who presents for follow up on chronic L shoulder pain (started 12/2018), was previously placed on light duty 2/22/19 and given NSAIDs and referral to PT   Shoulder pain believed to be due to overuse, as 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  L shoulder pain has improved since PT, and she feels she can return to full duty at this time, as she only has a little bit of residual pain in L AC joint after using her shoulder for extended periods of time, and with some sleeping positions laying on her L shoulder  She requests a letter today stating she may return to full duty  Not currently using any topical treatments such as ice, lidocaine, capsaicin, etc  Patient reports she tolerated PT treatment well, and per PT records, exhibited no increase in shoulder pain post treatment, did show signs of fatigue by end of strengthening exercises, had no pain with shoulder flexion (which was almost WNL), but was still limited with shoulder abduction  Patient denies any weakness or radicular symptoms in LUE  No imaging obtained to date  Review of Systems   Constitutional: Negative for appetite change, fatigue and fever  Eyes: Negative for visual disturbance  Respiratory: Negative for chest tightness and shortness of breath  Cardiovascular: Negative for chest pain, palpitations and leg swelling  Gastrointestinal: Negative for abdominal pain, constipation, diarrhea and vomiting  Genitourinary: Negative for dysuria  Musculoskeletal: Positive for arthralgias  Negative for joint swelling, neck pain and neck stiffness  Skin: Negative for rash  Neurological: Negative for dizziness, syncope, weakness and numbness  Psychiatric/Behavioral: Negative for dysphoric mood  The patient is not nervous/anxious          Historical Information   The patient history was reviewed as follows:    Past Medical History:   Diagnosis Date    Diabetes mellitus ([facility] [address] )     Endometriosis     Female infertility     Miscarriage     Urinary tract infection      Past Surgical History:   Procedure Laterality Date    ANASTOMOSIS      Last assessed 04/26/17- Oviductal Surgery Tubotubal anastomosis    LAPAROSCOPIC ENDOMETRIOSIS FULGURATION      Twice 2008, 2011 second surgury on tube was "reconstructed" and the other was blocked    WISDOM TOOTH EXTRACTION       Family History   Problem Relation Age of Onset    Diabetes Father     Hyperlipidemia Mother     Asthma Sister     Diabetes Paternal Grandmother       Social History   Social History     Substance and Sexual Activity   Alcohol Use No     Social History     Substance and Sexual Activity   Drug Use No     Social History     Tobacco Use   Smoking Status Never Smoker   Smokeless Tobacco Never Used       Medications:     Current Outpatient Medications:     ALYACEN 1/35 1-35 MG-MCG per tablet, TAKE 1 TABLET BY MOUTH ONCE DAILY (SKIP PLACEBO PILLS), Disp: , Rfl: 4    ferrous sulfate 325 (65 Fe) mg tablet, Take 1 tablet by mouth daily, Disp: , Rfl: 11    Glucose Blood (BLOOD GLUCOSE TEST VI), by In Vitro route 2 (two) times a day, Disp: , Rfl:     metFORMIN (GLUCOPHAGE) 1000 MG tablet, Take 1/2 tablet with morning meal and one whole tablet with evening meal, Disp: 135 tablet, Rfl: 0    naproxen (NAPROSYN) 500 mg tablet, Take 1 tablet (500 mg total) by mouth 2 (two) times a day with meals for 10 days, Disp: 20 tablet, Rfl: 0    NOVOLOG FLEXPEN 100 units/mL injection pen, INJECT 4 UNITS UNDER THE SKIN 3 (THREE) TIMES A DAY BEFORE MEALS , Disp: , Rfl: 3    ONETOUCH VERIO test strip, 1 each by Other route 2 (two) times a day Test, Disp: 100 each, Rfl: 5    glipiZIDE (GLUCOTROL) 5 mg tablet, TAKE 1 TABLET BY MOUTH EVERY DAY (Patient not taking: Reported on 2/22/2019), Disp: 30 tablet, Rfl: 2    methocarbamol (ROBAXIN) 500 mg tablet, Take 1 tablet (500 mg total) by mouth 3 (three) times a day for 2 days (Patient not taking: Reported on 3/25/2019), Disp: 6 tablet, Rfl: 0    naproxen (NAPROSYN) 500 mg tablet, Take 0 5 tablets (250 mg total) by mouth 2 (two) times a day with meals for 14 days (Patient not taking: Reported on 3/25/2019), Disp: 14 tablet, Rfl: 0  No Known Allergies    OBJECTIVE    Vitals:   Vitals:    03/25/19 1700   BP: 110/88   BP Location: Left arm   Patient Position: Sitting   Cuff Size: Large   Pulse: 80   Resp: 16   Temp: 99 2 °F (37 3 °C)   TempSrc: Tympanic   Weight: 84 7 kg (186 lb 12 8 oz)   Height: 5' 2" (1 575 m)       Physical Exam   Constitutional: She is oriented to person, place, and time  She appears well-developed and well-nourished  No distress  HENT:   Head: Normocephalic and atraumatic  Eyes: Right eye exhibits no discharge  Left eye exhibits no discharge  Neck: Normal range of motion  Neck supple  Cardiovascular: Normal rate, regular rhythm and intact distal pulses  Pulmonary/Chest: Effort normal and breath sounds normal    Abdominal: Soft  Bowel sounds are normal  She exhibits no distension  There is no tenderness  Musculoskeletal:   L shoulder without visible deformity or edema, mild tenderness to deep palpation over anterior L shoulder, Neer test (-), Reina-Abram test (+) with subjective pain over AC joint, empty can/Avani's test (-) for weakness or pain; R shoulder grossly normal   Neurological: She is alert and oriented to person, place, and time  No sensory deficit  Motor strength 5/5 B/L UE & LE   Skin: Skin is warm and dry  Capillary refill takes less than 2 seconds  Psychiatric: She has a normal mood and affect  Her behavior is normal    Vitals reviewed         Yissel Celaya MD, PGY-2  [facility]   3/25/2019

## 2019-03-25 NOTE — LETTER
March 25, 2019     Patient: Barrera Webster   YOB: 1980   Date of Visit: 3/25/2019       To Whom it May Concern:    Barrera Webster is under my professional care  She was seen in my office on 3/25/2019  She has been on light duty for the past 4 weeks due to pain in her left shoulder, for which she has been under the care of Physical Therapy and has made significant improvement in her range of motion  She may now return to full duty at work, but I have discussed with patient in detail that she should not "push through the pain" and should try to rest her shoulder if she has recurrent pain to prevent long-term injury to her L shoulder  Accordingly, I have advised her to discuss with her employer regarding accommodations for her to take breaks as needed for shoulder pain  Your cooperation in the well-being of my patient is appreciated  If you have any questions or concerns, please don't hesitate to call           Sincerely,          Angi Fernandez MD

## 2019-03-25 NOTE — TELEPHONE ENCOUNTER
Pt's  called requesting a letter for wife's employer stating she is released from light duty to go back to work with no restrictions  Please call when done

## 2019-03-25 NOTE — TELEPHONE ENCOUNTER
Patient last seen in February for shoulder, she will need f/u for work release  Please call to schedule

## 2019-03-26 NOTE — ASSESSMENT & PLAN NOTE
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, and her symptoms may return with continued overuse activity  -Advised patient she may be at risk for developing worsening of inflammation and/or injurious progression with continued overuse, and she should try to rest when symptomatic of shoulder pain, rather than choosing to "push through the pain"  -Wrote letter for patient allowing her to return to full duty as tolerated, but with caution for patient to start back slowly to avoid setbacks in the progress she has made  -Encouraged patient to consider discussing with HR department at her employment regarding whether FMLA paperwork might be a good option for her, as FMLA may allow patient to choose intermittent light duty privileges and/or L shoulder rest during acute exacerbation of symptoms   -Discussed application of ice/heat, topical treatments such as lidocaine, capsaicin, etc , and PRN PO options such as tylenol & NSAIDs as additional treatment modalities

## 2019-03-27 ENCOUNTER — APPOINTMENT (OUTPATIENT)
Dept: PHYSICAL THERAPY | Age: 39
End: 2019-03-27
Payer: COMMERCIAL

## 2019-04-01 ENCOUNTER — OFFICE VISIT (OUTPATIENT)
Dept: FAMILY MEDICINE CLINIC | Facility: CLINIC | Age: 39
End: 2019-04-01

## 2019-04-01 ENCOUNTER — APPOINTMENT (OUTPATIENT)
Dept: PHYSICAL THERAPY | Age: 39
End: 2019-04-01
Payer: COMMERCIAL

## 2019-04-01 VITALS
HEART RATE: 72 BPM | HEIGHT: 62 IN | DIASTOLIC BLOOD PRESSURE: 82 MMHG | TEMPERATURE: 98.2 F | SYSTOLIC BLOOD PRESSURE: 110 MMHG | RESPIRATION RATE: 16 BRPM | WEIGHT: 184.6 LBS | BODY MASS INDEX: 33.97 KG/M2

## 2019-04-01 DIAGNOSIS — G89.29 CHRONIC LEFT SHOULDER PAIN: Primary | ICD-10-CM

## 2019-04-01 DIAGNOSIS — M25.512 CHRONIC LEFT SHOULDER PAIN: Primary | ICD-10-CM

## 2019-04-01 PROCEDURE — 99213 OFFICE O/P EST LOW 20 MIN: CPT | Performed by: FAMILY MEDICINE

## 2019-04-03 ENCOUNTER — APPOINTMENT (OUTPATIENT)
Dept: PHYSICAL THERAPY | Age: 39
End: 2019-04-03
Payer: COMMERCIAL

## 2019-04-03 ENCOUNTER — TELEPHONE (OUTPATIENT)
Dept: FAMILY MEDICINE CLINIC | Facility: CLINIC | Age: 39
End: 2019-04-03

## 2019-04-05 DIAGNOSIS — G89.29 CHRONIC LEFT SHOULDER PAIN: Primary | ICD-10-CM

## 2019-04-05 DIAGNOSIS — M25.512 CHRONIC LEFT SHOULDER PAIN: Primary | ICD-10-CM

## 2019-04-10 ENCOUNTER — TELEPHONE (OUTPATIENT)
Dept: FAMILY MEDICINE CLINIC | Facility: CLINIC | Age: 39
End: 2019-04-10

## 2019-04-17 ENCOUNTER — EVALUATION (OUTPATIENT)
Dept: PHYSICAL THERAPY | Age: 39
End: 2019-04-17
Payer: COMMERCIAL

## 2019-04-17 DIAGNOSIS — G89.29 CHRONIC LEFT SHOULDER PAIN: Primary | ICD-10-CM

## 2019-04-17 DIAGNOSIS — M25.512 CHRONIC LEFT SHOULDER PAIN: Primary | ICD-10-CM

## 2019-04-17 PROCEDURE — 97162 PT EVAL MOD COMPLEX 30 MIN: CPT | Performed by: PHYSICAL THERAPIST

## 2019-04-17 PROCEDURE — 97110 THERAPEUTIC EXERCISES: CPT | Performed by: PHYSICAL THERAPIST

## 2019-04-22 ENCOUNTER — OFFICE VISIT (OUTPATIENT)
Dept: PHYSICAL THERAPY | Age: 39
End: 2019-04-22
Payer: COMMERCIAL

## 2019-04-22 DIAGNOSIS — M25.512 CHRONIC LEFT SHOULDER PAIN: Primary | ICD-10-CM

## 2019-04-22 DIAGNOSIS — G89.29 CHRONIC LEFT SHOULDER PAIN: Primary | ICD-10-CM

## 2019-04-22 PROCEDURE — 97112 NEUROMUSCULAR REEDUCATION: CPT | Performed by: PHYSICAL THERAPIST

## 2019-04-22 PROCEDURE — 97110 THERAPEUTIC EXERCISES: CPT | Performed by: PHYSICAL THERAPIST

## 2019-04-22 PROCEDURE — 97140 MANUAL THERAPY 1/> REGIONS: CPT | Performed by: PHYSICAL THERAPIST

## 2019-04-24 ENCOUNTER — OFFICE VISIT (OUTPATIENT)
Dept: PHYSICAL THERAPY | Age: 39
End: 2019-04-24
Payer: COMMERCIAL

## 2019-04-24 DIAGNOSIS — M25.512 CHRONIC LEFT SHOULDER PAIN: Primary | ICD-10-CM

## 2019-04-24 DIAGNOSIS — G89.29 CHRONIC LEFT SHOULDER PAIN: Primary | ICD-10-CM

## 2019-04-24 PROCEDURE — 97110 THERAPEUTIC EXERCISES: CPT

## 2019-04-24 PROCEDURE — 97140 MANUAL THERAPY 1/> REGIONS: CPT

## 2019-04-29 ENCOUNTER — OFFICE VISIT (OUTPATIENT)
Dept: PHYSICAL THERAPY | Age: 39
End: 2019-04-29
Payer: COMMERCIAL

## 2019-04-29 DIAGNOSIS — G89.29 CHRONIC LEFT SHOULDER PAIN: Primary | ICD-10-CM

## 2019-04-29 DIAGNOSIS — M25.512 CHRONIC LEFT SHOULDER PAIN: Primary | ICD-10-CM

## 2019-04-29 PROCEDURE — 97110 THERAPEUTIC EXERCISES: CPT

## 2019-04-29 PROCEDURE — 97140 MANUAL THERAPY 1/> REGIONS: CPT

## 2019-05-01 ENCOUNTER — OFFICE VISIT (OUTPATIENT)
Dept: PHYSICAL THERAPY | Age: 39
End: 2019-05-01
Payer: COMMERCIAL

## 2019-05-01 DIAGNOSIS — M25.512 CHRONIC LEFT SHOULDER PAIN: Primary | ICD-10-CM

## 2019-05-01 DIAGNOSIS — G89.29 CHRONIC LEFT SHOULDER PAIN: Primary | ICD-10-CM

## 2019-05-01 PROCEDURE — 97140 MANUAL THERAPY 1/> REGIONS: CPT | Performed by: PHYSICAL THERAPIST

## 2019-05-01 PROCEDURE — 97112 NEUROMUSCULAR REEDUCATION: CPT | Performed by: PHYSICAL THERAPIST

## 2019-05-01 PROCEDURE — 97110 THERAPEUTIC EXERCISES: CPT | Performed by: PHYSICAL THERAPIST

## 2019-05-06 ENCOUNTER — OFFICE VISIT (OUTPATIENT)
Dept: PHYSICAL THERAPY | Age: 39
End: 2019-05-06
Payer: COMMERCIAL

## 2019-05-06 DIAGNOSIS — M25.512 CHRONIC LEFT SHOULDER PAIN: Primary | ICD-10-CM

## 2019-05-06 DIAGNOSIS — G89.29 CHRONIC LEFT SHOULDER PAIN: Primary | ICD-10-CM

## 2019-05-06 PROCEDURE — 97140 MANUAL THERAPY 1/> REGIONS: CPT

## 2019-05-06 PROCEDURE — 97110 THERAPEUTIC EXERCISES: CPT

## 2019-05-08 ENCOUNTER — OFFICE VISIT (OUTPATIENT)
Dept: PHYSICAL THERAPY | Age: 39
End: 2019-05-08
Payer: COMMERCIAL

## 2019-05-08 DIAGNOSIS — M25.512 CHRONIC LEFT SHOULDER PAIN: Primary | ICD-10-CM

## 2019-05-08 DIAGNOSIS — G89.29 CHRONIC LEFT SHOULDER PAIN: Primary | ICD-10-CM

## 2019-05-08 PROCEDURE — 97140 MANUAL THERAPY 1/> REGIONS: CPT

## 2019-05-08 PROCEDURE — 97110 THERAPEUTIC EXERCISES: CPT

## 2019-05-13 ENCOUNTER — OFFICE VISIT (OUTPATIENT)
Dept: PHYSICAL THERAPY | Age: 39
End: 2019-05-13
Payer: COMMERCIAL

## 2019-05-13 DIAGNOSIS — G89.29 CHRONIC LEFT SHOULDER PAIN: Primary | ICD-10-CM

## 2019-05-13 DIAGNOSIS — M25.512 CHRONIC LEFT SHOULDER PAIN: Primary | ICD-10-CM

## 2019-05-13 PROCEDURE — 97140 MANUAL THERAPY 1/> REGIONS: CPT

## 2019-05-13 PROCEDURE — 97110 THERAPEUTIC EXERCISES: CPT

## 2019-05-15 ENCOUNTER — EVALUATION (OUTPATIENT)
Dept: PHYSICAL THERAPY | Age: 39
End: 2019-05-15
Payer: COMMERCIAL

## 2019-05-15 DIAGNOSIS — M25.512 CHRONIC LEFT SHOULDER PAIN: Primary | ICD-10-CM

## 2019-05-15 DIAGNOSIS — G89.29 CHRONIC LEFT SHOULDER PAIN: Primary | ICD-10-CM

## 2019-05-15 PROCEDURE — 97110 THERAPEUTIC EXERCISES: CPT | Performed by: PHYSICAL THERAPIST

## 2019-05-15 PROCEDURE — 97140 MANUAL THERAPY 1/> REGIONS: CPT | Performed by: PHYSICAL THERAPIST

## 2019-05-15 PROCEDURE — 97112 NEUROMUSCULAR REEDUCATION: CPT | Performed by: PHYSICAL THERAPIST

## 2019-05-20 ENCOUNTER — OFFICE VISIT (OUTPATIENT)
Dept: FAMILY MEDICINE CLINIC | Facility: CLINIC | Age: 39
End: 2019-05-20

## 2019-05-20 VITALS
WEIGHT: 185.2 LBS | RESPIRATION RATE: 14 BRPM | HEIGHT: 62 IN | HEART RATE: 74 BPM | DIASTOLIC BLOOD PRESSURE: 80 MMHG | TEMPERATURE: 99.2 F | BODY MASS INDEX: 34.08 KG/M2 | SYSTOLIC BLOOD PRESSURE: 112 MMHG

## 2019-05-20 DIAGNOSIS — M25.512 CHRONIC LEFT SHOULDER PAIN: Primary | ICD-10-CM

## 2019-05-20 DIAGNOSIS — H92.01 EAR PAIN, RIGHT: ICD-10-CM

## 2019-05-20 DIAGNOSIS — G89.29 CHRONIC LEFT SHOULDER PAIN: Primary | ICD-10-CM

## 2019-05-20 PROCEDURE — 99214 OFFICE O/P EST MOD 30 MIN: CPT | Performed by: FAMILY MEDICINE

## 2019-06-10 ENCOUNTER — HOSPITAL ENCOUNTER (OUTPATIENT)
Dept: MRI IMAGING | Facility: HOSPITAL | Age: 39
Discharge: HOME | End: 2019-06-10
Payer: COMMERCIAL

## 2019-06-10 ENCOUNTER — TELEPHONE (OUTPATIENT)
Dept: FAMILY MEDICINE CLINIC | Facility: CLINIC | Age: 39
End: 2019-06-10

## 2019-06-10 DIAGNOSIS — G89.29 CHRONIC LEFT SHOULDER PAIN: ICD-10-CM

## 2019-06-10 DIAGNOSIS — M25.512 CHRONIC LEFT SHOULDER PAIN: ICD-10-CM

## 2019-06-10 PROCEDURE — 73221 MRI JOINT UPR EXTREM W/O DYE: CPT

## 2019-06-12 ENCOUNTER — OFFICE VISIT (OUTPATIENT)
Dept: FAMILY MEDICINE CLINIC | Facility: CLINIC | Age: 39
End: 2019-06-12

## 2019-06-12 VITALS
BODY MASS INDEX: 33.93 KG/M2 | RESPIRATION RATE: 16 BRPM | DIASTOLIC BLOOD PRESSURE: 82 MMHG | HEIGHT: 62 IN | HEART RATE: 92 BPM | WEIGHT: 184.4 LBS | SYSTOLIC BLOOD PRESSURE: 108 MMHG | TEMPERATURE: 99.7 F

## 2019-06-12 DIAGNOSIS — G89.29 CHRONIC LEFT SHOULDER PAIN: Primary | ICD-10-CM

## 2019-06-12 DIAGNOSIS — M25.512 CHRONIC LEFT SHOULDER PAIN: Primary | ICD-10-CM

## 2019-06-12 PROCEDURE — 99203 OFFICE O/P NEW LOW 30 MIN: CPT | Performed by: FAMILY MEDICINE

## 2019-06-12 PROCEDURE — 3008F BODY MASS INDEX DOCD: CPT | Performed by: FAMILY MEDICINE

## 2019-07-01 ENCOUNTER — OFFICE VISIT (OUTPATIENT)
Dept: FAMILY MEDICINE CLINIC | Facility: CLINIC | Age: 39
End: 2019-07-01

## 2019-07-01 VITALS
DIASTOLIC BLOOD PRESSURE: 72 MMHG | RESPIRATION RATE: 16 BRPM | HEART RATE: 72 BPM | SYSTOLIC BLOOD PRESSURE: 118 MMHG | WEIGHT: 182.8 LBS | TEMPERATURE: 98.2 F | BODY MASS INDEX: 33.43 KG/M2

## 2019-07-01 DIAGNOSIS — G89.29 CHRONIC LEFT SHOULDER PAIN: Primary | ICD-10-CM

## 2019-07-01 DIAGNOSIS — M25.512 CHRONIC LEFT SHOULDER PAIN: Primary | ICD-10-CM

## 2019-07-01 PROCEDURE — 1036F TOBACCO NON-USER: CPT | Performed by: FAMILY MEDICINE

## 2019-07-01 PROCEDURE — 99213 OFFICE O/P EST LOW 20 MIN: CPT | Performed by: FAMILY MEDICINE

## 2019-07-01 NOTE — PROGRESS NOTES
Assessment/Plan:    Chronic left shoulder pain  Improved  Denies pain  Full rom of shoulder  Status post steroid injection at [facility]  Plans to follow with sports med  Apologized for misunderstanding and that she was inconvenienced  for today's visit  since she had no problem  Follow up as needed  Problem List Items Addressed This Visit        Other    Chronic left shoulder pain - Primary     Improved  Denies pain  Full rom of shoulder  Status post steroid injection at [facility]  Plans to follow with sports med  Apologized for misunderstanding and that she was inconvenienced  for today's visit  since she had no problem  Follow up as needed  Subjective:      Patient ID: Phillip Cervantes is a 44 y o  female  Sol Hand is a 28-year-old female was unsure why she had a follow-up appointment today  She says she was told to come back in 3-4 weeks  Upon last visit in early June MRI results reviewed with the patient and she was referred to Sports Medicine     She says she went to [facility] last week and got a steroid injection in her shoulder  She says her shoulder feels better  She is frustrated that she had come to the office today for no reason  She was under the impression that there is a specific reason for her to return today  The following portions of the patient's history were reviewed and updated as appropriate: allergies, current medications, past family history, past medical history, past social history, past surgical history and problem list     Review of Systems   Constitutional: Negative for chills and fever  Respiratory: Negative for cough and shortness of breath  Gastrointestinal: Negative for abdominal pain  Musculoskeletal: Negative for back pain and neck pain  No shoulder pain   All other systems reviewed and are negative          Objective:      /72 (BP Location: Left arm, Patient Position: Sitting, Cuff Size: Standard)   Pulse 72   Temp 98 2 °F (36 8 °C) (Tympanic)   Resp 16   Wt 82 9 kg (182 lb 12 8 oz)   BMI 33 43 kg/m²          Physical Exam   Constitutional: No distress  Musculoskeletal: She exhibits no tenderness (shoulder )  Skin: She is not diaphoretic

## 2019-07-01 NOTE — ASSESSMENT & PLAN NOTE
Improved  Denies pain  Full rom of shoulder  Status post steroid injection at [facility]  Plans to follow with sports med  Apologized for misunderstanding and that she was inconvenienced  for today's visit  since she had no problem  Follow up as needed

## 2019-07-15 ENCOUNTER — APPOINTMENT (OUTPATIENT)
Dept: URGENT CARE | Age: 39
End: 2019-07-15
Payer: COMMERCIAL

## 2019-07-15 PROCEDURE — G0383 LEV 4 HOSP TYPE B ED VISIT: HCPCS | Performed by: PREVENTIVE MEDICINE

## 2019-07-15 PROCEDURE — 99284 EMERGENCY DEPT VISIT MOD MDM: CPT | Performed by: PREVENTIVE MEDICINE

## 2020-11-15 ENCOUNTER — OFFICE VISIT (OUTPATIENT)
Dept: URGENT CARE | Age: 40
End: 2020-11-15
Payer: COMMERCIAL

## 2020-11-15 ENCOUNTER — TRANSCRIBE ORDERS (OUTPATIENT)
Dept: URGENT CARE | Age: 40
End: 2020-11-15

## 2020-11-15 VITALS
OXYGEN SATURATION: 100 % | HEART RATE: 77 BPM | HEIGHT: 62 IN | BODY MASS INDEX: 31.28 KG/M2 | WEIGHT: 170 LBS | TEMPERATURE: 97.7 F | RESPIRATION RATE: 18 BRPM

## 2020-11-15 DIAGNOSIS — J02.9 ACUTE PHARYNGITIS, UNSPECIFIED ETIOLOGY: ICD-10-CM

## 2020-11-15 DIAGNOSIS — Z11.59 SPECIAL SCREENING EXAMINATION FOR UNSPECIFIED VIRAL DISEASE: Primary | ICD-10-CM

## 2020-11-15 DIAGNOSIS — Z11.59 SPECIAL SCREENING EXAMINATION FOR UNSPECIFIED VIRAL DISEASE: ICD-10-CM

## 2020-11-15 DIAGNOSIS — R05.9 COUGH: Primary | ICD-10-CM

## 2020-11-15 PROCEDURE — U0003 INFECTIOUS AGENT DETECTION BY NUCLEIC ACID (DNA OR RNA); SEVERE ACUTE RESPIRATORY SYNDROME CORONAVIRUS 2 (SARS-COV-2) (CORONAVIRUS DISEASE [COVID-19]), AMPLIFIED PROBE TECHNIQUE, MAKING USE OF HIGH THROUGHPUT TECHNOLOGIES AS DESCRIBED BY CMS-2020-01-R: HCPCS | Performed by: NURSE PRACTITIONER

## 2020-11-15 PROCEDURE — 99213 OFFICE O/P EST LOW 20 MIN: CPT | Performed by: NURSE PRACTITIONER

## 2020-11-17 LAB — SARS-COV-2 RNA SPEC QL NAA+PROBE: NOT DETECTED
